# Patient Record
Sex: FEMALE | Race: WHITE | Employment: UNEMPLOYED | URBAN - METROPOLITAN AREA
[De-identification: names, ages, dates, MRNs, and addresses within clinical notes are randomized per-mention and may not be internally consistent; named-entity substitution may affect disease eponyms.]

---

## 2021-01-25 ENCOUNTER — TRANSCRIBE ORDER (OUTPATIENT)
Dept: SCHEDULING | Age: 51
End: 2021-01-25

## 2021-01-25 DIAGNOSIS — Z98.1 HISTORY OF FUSION OF CERVICAL SPINE: ICD-10-CM

## 2021-01-25 DIAGNOSIS — M54.12 CERVICAL RADICULOPATHY: Primary | ICD-10-CM

## 2021-02-02 ENCOUNTER — HOSPITAL ENCOUNTER (OUTPATIENT)
Dept: CT IMAGING | Age: 51
Discharge: HOME | End: 2021-02-02
Attending: ORTHOPAEDIC SURGERY
Payer: COMMERCIAL

## 2021-02-02 DIAGNOSIS — Z98.1 HISTORY OF FUSION OF CERVICAL SPINE: ICD-10-CM

## 2021-02-02 DIAGNOSIS — M54.12 CERVICAL RADICULOPATHY: ICD-10-CM

## 2021-02-02 PROCEDURE — 72125 CT NECK SPINE W/O DYE: CPT

## 2021-04-15 ENCOUNTER — HOSPITAL ENCOUNTER (OUTPATIENT)
Dept: PREADMISSION TESTING | Age: 51
Discharge: HOME | End: 2021-04-15
Attending: ORTHOPAEDIC SURGERY
Payer: COMMERCIAL

## 2021-04-15 VITALS
TEMPERATURE: 97.3 F | HEIGHT: 65 IN | HEART RATE: 75 BPM | RESPIRATION RATE: 19 BRPM | WEIGHT: 162.92 LBS | SYSTOLIC BLOOD PRESSURE: 152 MMHG | BODY MASS INDEX: 27.14 KG/M2 | OXYGEN SATURATION: 97 % | DIASTOLIC BLOOD PRESSURE: 70 MMHG

## 2021-04-15 LAB
25(OH)D3 SERPL-MCNC: 34.9 NG/ML (ref 30–100)
ABO + RH BLD: NORMAL
ALBUMIN SERPL-MCNC: 4.5 G/DL (ref 3.5–5)
ALBUMIN/GLOB SERPL: 1.5 {RATIO} (ref 1.1–2.2)
ALP SERPL-CCNC: 64 U/L (ref 45–117)
ALT SERPL-CCNC: 37 U/L (ref 12–78)
ANION GAP SERPL CALC-SCNC: 6 MMOL/L (ref 5–15)
APPEARANCE UR: CLEAR
APTT PPP: 27.4 SEC (ref 22.1–31)
AST SERPL-CCNC: 24 U/L (ref 15–37)
ATRIAL RATE: 79 BPM
BACTERIA URNS QL MICRO: NEGATIVE /HPF
BASOPHILS # BLD: 0.1 K/UL (ref 0–0.1)
BASOPHILS NFR BLD: 1 % (ref 0–1)
BILIRUB SERPL-MCNC: 0.5 MG/DL (ref 0.2–1)
BILIRUB UR QL: NEGATIVE
BLOOD GROUP ANTIBODIES SERPL: NORMAL
BUN SERPL-MCNC: 12 MG/DL (ref 6–20)
BUN/CREAT SERPL: 24 (ref 12–20)
CALCIUM SERPL-MCNC: 9.3 MG/DL (ref 8.5–10.1)
CALCULATED P AXIS, ECG09: 58 DEGREES
CALCULATED R AXIS, ECG10: 28 DEGREES
CALCULATED T AXIS, ECG11: 35 DEGREES
CHLORIDE SERPL-SCNC: 105 MMOL/L (ref 97–108)
CO2 SERPL-SCNC: 27 MMOL/L (ref 21–32)
COLOR UR: NORMAL
CREAT SERPL-MCNC: 0.51 MG/DL (ref 0.55–1.02)
DIAGNOSIS, 93000: NORMAL
DIFFERENTIAL METHOD BLD: ABNORMAL
EOSINOPHIL # BLD: 0.5 K/UL (ref 0–0.4)
EOSINOPHIL NFR BLD: 6 % (ref 0–7)
EPITH CASTS URNS QL MICRO: NORMAL /LPF
ERYTHROCYTE [DISTWIDTH] IN BLOOD BY AUTOMATED COUNT: 13.1 % (ref 11.5–14.5)
EST. AVERAGE GLUCOSE BLD GHB EST-MCNC: 111 MG/DL
GLOBULIN SER CALC-MCNC: 3 G/DL (ref 2–4)
GLUCOSE SERPL-MCNC: 83 MG/DL (ref 65–100)
GLUCOSE UR STRIP.AUTO-MCNC: NEGATIVE MG/DL
HBA1C MFR BLD: 5.5 % (ref 4–5.6)
HCT VFR BLD AUTO: 40.6 % (ref 35–47)
HGB BLD-MCNC: 13.7 G/DL (ref 11.5–16)
HGB UR QL STRIP: NEGATIVE
HYALINE CASTS URNS QL MICRO: NORMAL /LPF (ref 0–5)
IMM GRANULOCYTES # BLD AUTO: 0 K/UL (ref 0–0.04)
IMM GRANULOCYTES NFR BLD AUTO: 0 % (ref 0–0.5)
INR PPP: 1.1 (ref 0.9–1.1)
KETONES UR QL STRIP.AUTO: NEGATIVE MG/DL
LEUKOCYTE ESTERASE UR QL STRIP.AUTO: NEGATIVE
LYMPHOCYTES # BLD: 2.1 K/UL (ref 0.8–3.5)
LYMPHOCYTES NFR BLD: 29 % (ref 12–49)
MCH RBC QN AUTO: 30.4 PG (ref 26–34)
MCHC RBC AUTO-ENTMCNC: 33.7 G/DL (ref 30–36.5)
MCV RBC AUTO: 90.2 FL (ref 80–99)
MONOCYTES # BLD: 0.8 K/UL (ref 0–1)
MONOCYTES NFR BLD: 11 % (ref 5–13)
NEUTS SEG # BLD: 3.8 K/UL (ref 1.8–8)
NEUTS SEG NFR BLD: 53 % (ref 32–75)
NITRITE UR QL STRIP.AUTO: NEGATIVE
NRBC # BLD: 0 K/UL (ref 0–0.01)
NRBC BLD-RTO: 0 PER 100 WBC
P-R INTERVAL, ECG05: 168 MS
PH UR STRIP: 7 [PH] (ref 5–8)
PLATELET # BLD AUTO: 231 K/UL (ref 150–400)
PMV BLD AUTO: 11 FL (ref 8.9–12.9)
POTASSIUM SERPL-SCNC: 3.9 MMOL/L (ref 3.5–5.1)
PROT SERPL-MCNC: 7.5 G/DL (ref 6.4–8.2)
PROT UR STRIP-MCNC: NEGATIVE MG/DL
PROTHROMBIN TIME: 11 SEC (ref 9–11.1)
Q-T INTERVAL, ECG07: 390 MS
QRS DURATION, ECG06: 84 MS
QTC CALCULATION (BEZET), ECG08: 447 MS
RBC # BLD AUTO: 4.5 M/UL (ref 3.8–5.2)
RBC #/AREA URNS HPF: NORMAL /HPF (ref 0–5)
SODIUM SERPL-SCNC: 138 MMOL/L (ref 136–145)
SP GR UR REFRACTOMETRY: 1.01 (ref 1–1.03)
SPECIMEN EXP DATE BLD: NORMAL
THERAPEUTIC RANGE,PTTT: NORMAL SECS (ref 58–77)
UA: UC IF INDICATED,UAUC: NORMAL
UROBILINOGEN UR QL STRIP.AUTO: 0.2 EU/DL (ref 0.2–1)
VENTRICULAR RATE, ECG03: 79 BPM
WBC # BLD AUTO: 7.2 K/UL (ref 3.6–11)
WBC URNS QL MICRO: NORMAL /HPF (ref 0–4)

## 2021-04-15 PROCEDURE — 85610 PROTHROMBIN TIME: CPT

## 2021-04-15 PROCEDURE — 86901 BLOOD TYPING SEROLOGIC RH(D): CPT

## 2021-04-15 PROCEDURE — 93005 ELECTROCARDIOGRAM TRACING: CPT

## 2021-04-15 PROCEDURE — 83036 HEMOGLOBIN GLYCOSYLATED A1C: CPT

## 2021-04-15 PROCEDURE — 85730 THROMBOPLASTIN TIME PARTIAL: CPT

## 2021-04-15 PROCEDURE — 82306 VITAMIN D 25 HYDROXY: CPT

## 2021-04-15 PROCEDURE — 80053 COMPREHEN METABOLIC PANEL: CPT

## 2021-04-15 PROCEDURE — 85025 COMPLETE CBC W/AUTO DIFF WBC: CPT

## 2021-04-15 PROCEDURE — 81001 URINALYSIS AUTO W/SCOPE: CPT

## 2021-04-15 PROCEDURE — 36415 COLL VENOUS BLD VENIPUNCTURE: CPT

## 2021-04-15 RX ORDER — BISMUTH SUBSALICYLATE 262 MG
1 TABLET,CHEWABLE ORAL DAILY
COMMUNITY
End: 2022-08-03

## 2021-04-15 RX ORDER — ACETAMINOPHEN 500 MG
1000 TABLET ORAL AS NEEDED
COMMUNITY

## 2021-04-15 RX ORDER — SIMVASTATIN 40 MG/1
40 TABLET, FILM COATED ORAL
COMMUNITY

## 2021-04-15 RX ORDER — IBUPROFEN 200 MG
400 TABLET ORAL AS NEEDED
COMMUNITY
End: 2021-04-30

## 2021-04-15 NOTE — PERIOP NOTES
[address] , [address] [facility]   MAIN OR                                  [phone]   MAIN PRE OP                          [phone]                                                                                AMBULATORY PRE OP          [phone]  PRE-ADMISSION TESTING    [phone]   Surgery Date: Tuesday 4/27/21      Is surgery arrival time given by surgeon? NO  If [facility] staff will call you Monday 4/26/21 between 3 and 7pm the day before your surgery with your arrival time. (If your surgery is on a Monday, we will call you the Friday before.)    Call [phone] after 7pm Monday-Friday if you did not receive this call. INSTRUCTIONS BEFORE YOUR SURGERY   When You  Arrive Arrive at the 2nd [address] [facility] on the day of your surgery  Have your insurance card, photo ID, and any copayment (if needed)   Food   and   Drink NO food or drink after midnight the night before surgery    This means NO water, gum, mints, coffee, juice, etc.  No alcohol (beer, wine, liquor) 24 hours before and after surgery   Medications to   TAKE   Morning of Surgery MEDICATIONS TO TAKE THE MORNING OF SURGERY WITH A SIP OF WATER:    none   Medications  To  STOP      7 days before surgery  Non-Steroidal anti-inflammatory Drugs (NSAID's): for example, Ibuprofen (Advil, Motrin), Naproxen (Aleve)   Aspirin, if taking for pain    Herbal supplements, vitamins, and fish oil     Blood  Thinners  If you take  Aspirin, Plavix, Coumadin, or any blood-thinning or anti-blood clot medicine, talk to the doctor who prescribed the medications for pre-operative instructions.    Bathing Clothing  Jewelry  Valuables      MAY shower the morning of surgery, please do not apply anything to your skin (lotions, powders, deodorant, or makeup, especially mascara)   Follow Chlorhexidine Care Fusion body wash instructions provided to you during PAT appointment. Begin 3 days prior to surgery.  Do not shave or trim anywhere 24 hours before surgery   Wear your hair loose or down; no pony-tails, buns, or metal hair clips   Wear loose, comfortable, clean clothes   Wear glasses instead of contacts   Leave money, valuables, and jewelry, including body piercings, at home   Going Home - or Spending the Night  SAME-DAY SURGERY: You must have a responsible adult drive you home and stay with you 24 hours after surgery   ADMITS: If your doctor is keeping you in the hospital after surgery, leave personal belongings/luggage in your car until you have a hospital room number. Hospital discharge time is 12 noon  Drivers must be here before 12 noon unless you are told differently   Special Instructions · Bring PTA Medication list day of surgery with the last doses taken documented   · Do not bring medication bottles the day of surgery      It is now mandated that all surgical patients be tested for COVID-19 prior to surgery. Testing has to be exactly 4 days prior to surgery. Your COVID test date is Friday 4/23/21 between 8:00 am and 11:00 am.       COVID testing will be performed curbside at the [facility] [address]. There will be signs leading you to the testing site. You will need to bring a photo ID with you to be swabbed. Patients are advised to self-quarantine at home after testing and prior to your surgery date. You will be notified if your results are positive.     What to watch for:   Coronavirus (COVID-19) affects different people in different ways   It also appears with a wide range of symptoms from mild to severe   Signs usually appear 2-14 days after exposure     If you develop any of the following, notify your doctor immediately:  o Fever  o Chills, with or without a shiver  o Muscle pain  o Headache  o Sore throat  o Dry cough  o New loss of taste or smell  o Tiredness      If you develop any of the following, call 911:  o Shortness of breath  o Difficulty breathing  o Chest pain  o New confusion  o Blueness of fingers and/or lips     Follow all instructions so your surgery wont be cancelled. Please, be on time. If a situation occurs and you are delayed the day of surgery, call [phone] or [phone]. If your physical condition changes (like a fever, cold, flu, etc.) call your surgeon. Home medication(s) reviewed and verified via     LIST   VERBAL   during PAT appointment. The patient was contacted by     IN-PERSON  The patient verbalizes understanding of all instructions and     DOES NOT   need reinforcement.

## 2021-04-15 NOTE — H&P
Preoperative Evaluation                     History and Physical with Surgical Risk Stratification     4/15/2021    CC: Neck pain    HPI:   Eleazar Lea is a 48 y.o. female referred for pre-operative evaluation by Dr. Elizabeth Ren for surgery on 4/27/21 & 4/28/21. Faheem Shah has had three previous neck fusions, first one in 2017 was a C4-7 at [facility]. She then had two more, 3/2020 & 7/2020 C3-4 and a posterior C6-7 at [address] [place] [address]. After her march surgery she felt good until her pain returned and she underwent the second sx in July. She notes after that surgery her pain never left and has gotten worse. She has muscle spasms in her upper back, down both arms into her hands. Her left arm feels worse. She has intermittent pins and needles. She has weakness in both arms and drops things. She has failed all conservative treatments and wishes to proceed with surgery. The patient was evaluated in the surgeon's office and it was determined that the most appropriate plan of care is to proceed with surgical intervention. Patient's PCP Alex Sprague MD    Review of Systems     Constitutional: Negative for chills and fever  HENT: Negative for congestion and sore throat  Eyes: negative for blurred vision and double vision  Respiratory: Negative for cough, shortness of breath and wheezing  Mouth: Negative for loose, broken or chipped teeth. Cardiovascular: Negative for chest pain and palpitations  Gastrointestinal: Negative for abdominal pain, nausea, diarrhea & constipation  Genitourinary: Negative for dysuria and hematuria  Musculoskeletal: Neck pain  Skin: Negative for rash, open wounds. Neurological: Negative for dizziness, tremors and headaches  Psychiatric: The patient is not nervous/anxious.     Inherent Risk of Surgery     Surgical risk:  Intermediate  Intermediate:  Joint Replacement, Spinal surgery, abdominal, thoracic, carotid endarterctomy, prostate, head and neck    Patient Cardiac Risk Assessment Revised Cardiac Risk Index (RCRI)    Rate if cardiac death, nonfatal MI, nonfatal cardiac arrest by number of risk factor- 0.4%    CASSANDRA/AHA 2007 Guidelines:   1) Surgery Emergency, Non-cardiac -> to surgery  2) If not, look at clinical predictors    Intermediate Minor     Blood Thinner: NA    METS     >4 METS Climb a flight of stairs or a hill Walk on level ground at 4 mph Run a short distance Heavy work around house (scrub floors, move furniture)     Other Risk Factors:   Screening for ETOH use:  Done and low risk  Smoking status:  Former   Marijuana Use:  No    Personal or FH of bleeding problems:  No  Personal or FH of blood clots:  No  Personal or FH of anesthesia problems:   PONV    Pulmonary Risk:  Asthma or COPD:  No  Body mass index is 27.11 kg/m². Known MATT:  No    Past Medical, Surgical, Social History     Allergies: Allergies   Allergen Reactions    Erythromycin Other (comments)     \"stomach pain\" vomiting    Penicillins Rash and Swelling       Medication Documentation Review Audit     Reviewed by Gianluca Chen RN (Registered Nurse) on 04/15/21 at 1108    Medication Sig Documenting Provider Last Dose Status Taking?   acetaminophen (Tylenol Extra Strength) 500 mg tablet Take 1,000 mg by mouth as needed for Pain. Provider, Historical  Active Yes   ibuprofen (AdviL) 200 mg tablet Take 400 mg by mouth as needed for Pain. Provider, Historical  Active Yes   multivitamin (ONE A DAY) tablet Take 1 Tab by mouth daily. Provider, Historical  Active Yes   simvastatin (ZOCOR) 40 mg tablet Take 40 mg by mouth nightly.  Provider, Historical  Active Yes                Past Medical History:   Diagnosis Date    Chronic neck pain     COVID-19 vaccine series completed 04/2021    Moderna    High cholesterol     PONV (postoperative nausea and vomiting)      Past Surgical History:   Procedure Laterality Date    HX APPENDECTOMY      HX BUNIONECTOMY Left     HX CARPAL TUNNEL RELEASE Bilateral     HX CERVICAL FUSION  2017    C 4-7 @ [place] with Dr. Neno Ross 59  2020    C 3-4    HX CERVICAL FUSION  2020    C6-7 posterior    HX HYSTERECTOMY      HX LAP CHOLECYSTECTOMY      HX PARTIAL THYROIDECTOMY      HX TONSILLECTOMY      IR INJ TENDON SHEATH/LIGA SNGL Bilateral     Elbow     Social History     Tobacco Use    Smoking status: Former Smoker     Types: Cigarettes     Quit date: 2000     Years since quittin.3    Smokeless tobacco: Never Used   Substance Use Topics    Alcohol use: Yes     Alcohol/week: 2.0 standard drinks     Types: 2 Glasses of wine per week    Drug use: Never     Family History   Problem Relation Age of Onset    Anesth Problems Neg Hx     Clotting Disorder Neg Hx        Objective     Vitals:    04/15/21 1105   BP: (!) 152/70   Pulse: 75   Resp: 19   Temp: 97.3 °F (36.3 °C)   SpO2: 97%   Weight: 73.9 kg (162 lb 14.7 oz)   Height: 5' 5\" (1.651 m)       Constitutional:  Appears well,  No Acute Distress, Vitals noted  Psychiatric:   Affect normal, Alert and Oriented to person/place/time    Eyes:   Pupils equally round and reactive, EOMI, conjunctiva clear, eyelids normal  ENT:   External ears and nose normal, teeth normal, gums normal, TMs and Orophyarynx normal  Neck:   General inspection and Thyroid normal.  No abnormal cervical or supraclavicular nodes    Lungs:   Clear to auscultation, good respiratory effort  Heart: Ausculation normal.  Regular rhythm. No cardiac murmurs.   No carotid bruits or palpable thrills  Chest wall normal  Musculoskeletal:  weaker with left hand, limited ROM to cervical spine  Extremities:   Without edema, good peripheral pulses  Skin:   Warm to palpation, without rashes, bruising, or suspicious lesions     Recent Results (from the past 168 hour(s))   HEMOGLOBIN A1C WITH EAG    Collection Time: 04/15/21 12:11 PM   Result Value Ref Range    Hemoglobin A1c 5.5 4.0 - 5.6 %    Est. average glucose 111 mg/dL   PROTHROMBIN TIME + INR Collection Time: 04/15/21 12:11 PM   Result Value Ref Range    INR 1.1 0.9 - 1.1      Prothrombin time 11.0 9.0 - 11.1 sec   PTT    Collection Time: 04/15/21 12:11 PM   Result Value Ref Range    aPTT 27.4 22.1 - 31.0 sec    aPTT, therapeutic range     58.0 - 77.0 SECS   URINALYSIS W/ REFLEX CULTURE    Collection Time: 04/15/21 12:11 PM    Specimen: Urine   Result Value Ref Range    Color YELLOW/STRAW      Appearance CLEAR CLEAR      Specific gravity 1.015 1.003 - 1.030      pH (UA) 7.0 5.0 - 8.0      Protein Negative NEG mg/dL    Glucose Negative NEG mg/dL    Ketone Negative NEG mg/dL    Bilirubin Negative NEG      Blood Negative NEG      Urobilinogen 0.2 0.2 - 1.0 EU/dL    Nitrites Negative NEG      Leukocyte Esterase Negative NEG      WBC 0-4 0 - 4 /hpf    RBC 0-5 0 - 5 /hpf    Epithelial cells FEW FEW /lpf    Bacteria Negative NEG /hpf    UA:UC IF INDICATED CULTURE NOT INDICATED BY UA RESULT CNI      Hyaline cast 0-2 0 - 5 /lpf   VITAMIN D, 25 HYDROXY    Collection Time: 04/15/21 12:11 PM   Result Value Ref Range    Vitamin D 25-Hydroxy 34.9 30 - 100 ng/mL   TYPE & SCREEN    Collection Time: 04/15/21 12:11 PM   Result Value Ref Range    Crossmatch Expiration 04/29/2021,2359     ABO/Rh(D) A POSITIVE     Antibody screen NEG    CBC WITH AUTOMATED DIFF    Collection Time: 04/15/21 12:35 PM   Result Value Ref Range    WBC 7.2 3.6 - 11.0 K/uL    RBC 4.50 3.80 - 5.20 M/uL    HGB 13.7 11.5 - 16.0 g/dL    HCT 40.6 35.0 - 47.0 %    MCV 90.2 80.0 - 99.0 FL    MCH 30.4 26.0 - 34.0 PG    MCHC 33.7 30.0 - 36.5 g/dL    RDW 13.1 11.5 - 14.5 %    PLATELET 900 623 - 446 K/uL    MPV 11.0 8.9 - 12.9 FL    NRBC 0.0 0  WBC    ABSOLUTE NRBC 0.00 0.00 - 0.01 K/uL    NEUTROPHILS 53 32 - 75 %    LYMPHOCYTES 29 12 - 49 %    MONOCYTES 11 5 - 13 %    EOSINOPHILS 6 0 - 7 %    BASOPHILS 1 0 - 1 %    IMMATURE GRANULOCYTES 0 0.0 - 0.5 %    ABS. NEUTROPHILS 3.8 1.8 - 8.0 K/UL    ABS. LYMPHOCYTES 2.1 0.8 - 3.5 K/UL    ABS.  MONOCYTES 0.8 0.0 - 1.0 K/UL    ABS. EOSINOPHILS 0.5 (H) 0.0 - 0.4 K/UL    ABS. BASOPHILS 0.1 0.0 - 0.1 K/UL    ABS. IMM. GRANS. 0.0 0.00 - 0.04 K/UL    DF AUTOMATED     METABOLIC PANEL, COMPREHENSIVE    Collection Time: 04/15/21 12:35 PM   Result Value Ref Range    Sodium 138 136 - 145 mmol/L    Potassium 3.9 3.5 - 5.1 mmol/L    Chloride 105 97 - 108 mmol/L    CO2 27 21 - 32 mmol/L    Anion gap 6 5 - 15 mmol/L    Glucose 83 65 - 100 mg/dL    BUN 12 6 - 20 MG/DL    Creatinine 0.51 (L) 0.55 - 1.02 MG/DL    BUN/Creatinine ratio 24 (H) 12 - 20      GFR est AA >60 >60 ml/min/1.73m2    GFR est non-AA >60 >60 ml/min/1.73m2    Calcium 9.3 8.5 - 10.1 MG/DL    Bilirubin, total 0.5 0.2 - 1.0 MG/DL    ALT (SGPT) 37 12 - 78 U/L    AST (SGOT) 24 15 - 37 U/L    Alk. phosphatase 64 45 - 117 U/L    Protein, total 7.5 6.4 - 8.2 g/dL    Albumin 4.5 3.5 - 5.0 g/dL    Globulin 3.0 2.0 - 4.0 g/dL    A-G Ratio 1.5 1.1 - 2.2     EKG, 12 LEAD, INITIAL    Collection Time: 04/15/21 12:42 PM   Result Value Ref Range    Ventricular Rate 79 BPM    Atrial Rate 79 BPM    P-R Interval 168 ms    QRS Duration 84 ms    Q-T Interval 390 ms    QTC Calculation (Bezet) 447 ms    Calculated P Axis 58 degrees    Calculated R Axis 28 degrees    Calculated T Axis 35 degrees    Diagnosis       Normal sinus rhythm  Normal ECG  No previous ECGs available  Confirmed by Jade Kanner MD., Elana Dust ([id number]) on 4/15/2021 6:32:35 PM         Assessment and Plan     Assessment/Plan:   1) Cervical Stenosis  2) Pre-Operative Evaluation    Labs and EKG reviewed.  MRSA pending    Plan:  4/27/21: C4-7 ANTERIOR HARDWARE REMOVAL, C7 - T1 ANTERIOR CERVICAL DISCECTOMY AND  FUSION WITH INSTRUMENTATION, OSTEO AMP AND ILIAC CREST BONE MARROW ASPIRATE    4/28/21: C6-7 POSTERIOR HARDWARE REMOVAL/ C3-T2 POSTERIOR CERVICAL FUSION WITH INSTRUMENTATION/OSTEO AMP AND ILIAC CREST BONE MARROW ASPIRATE WITH IMAGE GUIDANCE    Preoperative Clearance  Per RCRI, the patient has a 0.4% risk of cardiac death, nonfatal MI, nonfatal cardiac arrest based on no risk factors. Per ACC/AHA guidelines, patient is low risk for a(n) intermediate risk surgery and may proceed to planned surgery with the above noted risk.     Tammy Hamilton, NP

## 2021-04-16 LAB
BACTERIA SPEC CULT: NORMAL
BACTERIA SPEC CULT: NORMAL
SERVICE CMNT-IMP: NORMAL

## 2021-04-23 ENCOUNTER — HOSPITAL ENCOUNTER (OUTPATIENT)
Dept: PREADMISSION TESTING | Age: 51
Discharge: HOME | End: 2021-04-23
Payer: COMMERCIAL

## 2021-04-23 PROCEDURE — U0005 INFEC AGEN DETEC AMPLI PROBE: HCPCS

## 2021-04-24 LAB — SARS-COV-2, COV2NT: NOT DETECTED

## 2021-04-26 ENCOUNTER — ANESTHESIA EVENT (OUTPATIENT)
Dept: SURGERY | Age: 51
DRG: 454 | End: 2021-04-26
Payer: COMMERCIAL

## 2021-04-26 NOTE — PROGRESS NOTES
The patient was provided a virtul link to view the pre-operative Spine Class. A pre-operative Patient education booklet specific to spine surgery was given to the patient in PAT. The content of the class was presented using an audio power point presentation specific for patients undergoing spine surgery. Incentive spirometer and CHG bath kits were verbally reviewed. Day of surgery routine and expectations, hospital routine and expectations, nutrition, alcohol, nicotine, medications, infection control, pain management, DVT precautions and equipment, ice therapy, durable medical equipment, exercises, mobility expectations and precautions, home preparation and safety were reviewed in class. My contact information was shared with the patient to provide further information as requested by the patient related to their upcoming surgery. Patient sent email confirmation that they viewed spine class online.

## 2021-04-27 ENCOUNTER — HOSPITAL ENCOUNTER (INPATIENT)
Age: 51
LOS: 3 days | Discharge: HOME | DRG: 454 | End: 2021-04-30
Attending: ORTHOPAEDIC SURGERY | Admitting: ORTHOPAEDIC SURGERY
Payer: COMMERCIAL

## 2021-04-27 ENCOUNTER — ANESTHESIA EVENT (OUTPATIENT)
Dept: SURGERY | Age: 51
DRG: 454 | End: 2021-04-27
Payer: COMMERCIAL

## 2021-04-27 ENCOUNTER — ANESTHESIA (OUTPATIENT)
Dept: SURGERY | Age: 51
DRG: 454 | End: 2021-04-27
Payer: COMMERCIAL

## 2021-04-27 ENCOUNTER — APPOINTMENT (OUTPATIENT)
Dept: GENERAL RADIOLOGY | Age: 51
DRG: 454 | End: 2021-04-27
Attending: ORTHOPAEDIC SURGERY
Payer: COMMERCIAL

## 2021-04-27 DIAGNOSIS — M43.12 ACQUIRED SPONDYLOLISTHESIS OF CERVICAL VERTEBRA: Primary | ICD-10-CM

## 2021-04-27 PROCEDURE — 74011250636 HC RX REV CODE- 250/636: Performed by: NURSE PRACTITIONER

## 2021-04-27 PROCEDURE — 77030031139 HC SUT VCRL2 J&J -A: Performed by: ORTHOPAEDIC SURGERY

## 2021-04-27 PROCEDURE — 77030033138 HC SUT PGA STRATFX J&J -B: Performed by: ORTHOPAEDIC SURGERY

## 2021-04-27 PROCEDURE — 77030013079 HC BLNKT BAIR HGGR 3M -A: Performed by: ANESTHESIOLOGY

## 2021-04-27 PROCEDURE — 77030041279 HC DRSG PRMSL AG MDII -B: Performed by: ORTHOPAEDIC SURGERY

## 2021-04-27 PROCEDURE — 77030038222 HC BUR MIDSREX MEDT -D: Performed by: ORTHOPAEDIC SURGERY

## 2021-04-27 PROCEDURE — 77030028271 HC SRGFL HEMSTAT MTRX KT J&J -C: Performed by: ORTHOPAEDIC SURGERY

## 2021-04-27 PROCEDURE — 74011250636 HC RX REV CODE- 250/636: Performed by: ANESTHESIOLOGY

## 2021-04-27 PROCEDURE — 74011250637 HC RX REV CODE- 250/637: Performed by: NURSE PRACTITIONER

## 2021-04-27 PROCEDURE — 74011250637 HC RX REV CODE- 250/637: Performed by: ANESTHESIOLOGY

## 2021-04-27 PROCEDURE — 65270000029 HC RM PRIVATE

## 2021-04-27 PROCEDURE — 74011000250 HC RX REV CODE- 250: Performed by: NURSE PRACTITIONER

## 2021-04-27 PROCEDURE — 07DR3ZZ EXTRACTION OF ILIAC BONE MARROW, PERCUTANEOUS APPROACH: ICD-10-PCS | Performed by: ORTHOPAEDIC SURGERY

## 2021-04-27 PROCEDURE — 0RT50ZZ RESECTION OF CERVICOTHORACIC VERTEBRAL DISC, OPEN APPROACH: ICD-10-PCS | Performed by: ORTHOPAEDIC SURGERY

## 2021-04-27 PROCEDURE — C1713 ANCHOR/SCREW BN/BN,TIS/BN: HCPCS | Performed by: ORTHOPAEDIC SURGERY

## 2021-04-27 PROCEDURE — 74011250636 HC RX REV CODE- 250/636: Performed by: ORTHOPAEDIC SURGERY

## 2021-04-27 PROCEDURE — C1889 IMPLANT/INSERT DEVICE, NOC: HCPCS | Performed by: ORTHOPAEDIC SURGERY

## 2021-04-27 PROCEDURE — 76060000037 HC ANESTHESIA 3 TO 3.5 HR: Performed by: ORTHOPAEDIC SURGERY

## 2021-04-27 PROCEDURE — 74011250636 HC RX REV CODE- 250/636: Performed by: NURSE ANESTHETIST, CERTIFIED REGISTERED

## 2021-04-27 PROCEDURE — 77030039147 HC PWDR HEMSTS SURGICEL JNJ -D: Performed by: ORTHOPAEDIC SURGERY

## 2021-04-27 PROCEDURE — 77030037058 HC GRFT BN PTTY OSTEOAMP BTUS -F: Performed by: ORTHOPAEDIC SURGERY

## 2021-04-27 PROCEDURE — 77030011267 HC ELECTRD BLD COVD -A: Performed by: ORTHOPAEDIC SURGERY

## 2021-04-27 PROCEDURE — 0PP304Z REMOVAL OF INTERNAL FIXATION DEVICE FROM CERVICAL VERTEBRA, OPEN APPROACH: ICD-10-PCS | Performed by: ORTHOPAEDIC SURGERY

## 2021-04-27 PROCEDURE — 94760 N-INVAS EAR/PLS OXIMETRY 1: CPT

## 2021-04-27 PROCEDURE — 77030040922 HC BLNKT HYPOTHRM STRY -A

## 2021-04-27 PROCEDURE — 77030003666 HC NDL SPINAL BD -A: Performed by: ORTHOPAEDIC SURGERY

## 2021-04-27 PROCEDURE — 77030040356 HC CORD BPLR FRCP COVD -A: Performed by: ORTHOPAEDIC SURGERY

## 2021-04-27 PROCEDURE — 0RB53ZZ EXCISION OF CERVICOTHORACIC VERTEBRAL DISC, PERCUTANEOUS APPROACH: ICD-10-PCS | Performed by: ORTHOPAEDIC SURGERY

## 2021-04-27 PROCEDURE — 77030027138 HC INCENT SPIROMETER -A

## 2021-04-27 PROCEDURE — 77030040361 HC SLV COMPR DVT MDII -B

## 2021-04-27 PROCEDURE — 74011000250 HC RX REV CODE- 250: Performed by: NURSE ANESTHETIST, CERTIFIED REGISTERED

## 2021-04-27 PROCEDURE — 77030034475 HC MISC IMPL SPN: Performed by: ORTHOPAEDIC SURGERY

## 2021-04-27 PROCEDURE — 77030008684 HC TU ET CUF COVD -B: Performed by: ANESTHESIOLOGY

## 2021-04-27 PROCEDURE — 74011250636 HC RX REV CODE- 250/636

## 2021-04-27 PROCEDURE — 77030008771 HC TU NG SALEM SUMP -A: Performed by: ANESTHESIOLOGY

## 2021-04-27 PROCEDURE — 2709999900 HC NON-CHARGEABLE SUPPLY: Performed by: ORTHOPAEDIC SURGERY

## 2021-04-27 PROCEDURE — 77030026438 HC STYL ET INTUB CARD -A: Performed by: ANESTHESIOLOGY

## 2021-04-27 PROCEDURE — 76010000173 HC OR TIME 3 TO 3.5 HR INTENSV-TIER 1: Performed by: ORTHOPAEDIC SURGERY

## 2021-04-27 PROCEDURE — 77030030102 HC BIT DRL PYRNES K2M -B: Performed by: ORTHOPAEDIC SURGERY

## 2021-04-27 PROCEDURE — 77030010507 HC ADH SKN DERMBND J&J -B: Performed by: ORTHOPAEDIC SURGERY

## 2021-04-27 PROCEDURE — 77030005513 HC CATH URETH FOL11 MDII -B: Performed by: ORTHOPAEDIC SURGERY

## 2021-04-27 PROCEDURE — 74011000250 HC RX REV CODE- 250: Performed by: ORTHOPAEDIC SURGERY

## 2021-04-27 PROCEDURE — 77030040922 HC BLNKT HYPOTHRM STRY -A: Performed by: ORTHOPAEDIC SURGERY

## 2021-04-27 PROCEDURE — 77030035129: Performed by: ORTHOPAEDIC SURGERY

## 2021-04-27 PROCEDURE — 77030018673: Performed by: ORTHOPAEDIC SURGERY

## 2021-04-27 PROCEDURE — 0RG40A0 FUSION OF CERVICOTHORACIC VERTEBRAL JOINT WITH INTERBODY FUSION DEVICE, ANTERIOR APPROACH, ANTERIOR COLUMN, OPEN APPROACH: ICD-10-PCS | Performed by: ORTHOPAEDIC SURGERY

## 2021-04-27 PROCEDURE — 76210000002 HC OR PH I REC 3 TO 3.5 HR: Performed by: ORTHOPAEDIC SURGERY

## 2021-04-27 PROCEDURE — 77030038552 HC DRN WND MDII -A: Performed by: ORTHOPAEDIC SURGERY

## 2021-04-27 DEVICE — GRAFT BONE SUB 2CC MTRX VIABLE NEXT GENERATION VIBONE: Type: IMPLANTABLE DEVICE | Site: SPINE CERVICAL | Status: FUNCTIONAL

## 2021-04-27 DEVICE — CAGE SPNL W14XH7MM D12MM 6DEG ANT CERV INTBDY FUS LORD W/: Type: IMPLANTABLE DEVICE | Site: SPINE CERVICAL | Status: FUNCTIONAL

## 2021-04-27 DEVICE — IMPLANTABLE DEVICE: Type: IMPLANTABLE DEVICE | Site: SPINE CERVICAL | Status: FUNCTIONAL

## 2021-04-27 DEVICE — PLATE SPNL 1 LEVEL 20 MM ANTR CERV CONSTRN PYRENESS: Type: IMPLANTABLE DEVICE | Site: SPINE CERVICAL | Status: FUNCTIONAL

## 2021-04-27 DEVICE — GRAFT BNE FIBER 1 CC OSTEOAMP SEL: Type: IMPLANTABLE DEVICE | Site: SPINE CERVICAL | Status: FUNCTIONAL

## 2021-04-27 RX ORDER — ACETAMINOPHEN 500 MG
1000 TABLET ORAL EVERY 6 HOURS
Status: DISCONTINUED | OUTPATIENT
Start: 2021-04-27 | End: 2021-04-28

## 2021-04-27 RX ORDER — NALOXONE HYDROCHLORIDE 0.4 MG/ML
0.2 INJECTION, SOLUTION INTRAMUSCULAR; INTRAVENOUS; SUBCUTANEOUS
Status: DISCONTINUED | OUTPATIENT
Start: 2021-04-27 | End: 2021-04-27 | Stop reason: HOSPADM

## 2021-04-27 RX ORDER — FAMOTIDINE 20 MG/1
20 TABLET, FILM COATED ORAL 2 TIMES DAILY
Status: DISCONTINUED | OUTPATIENT
Start: 2021-04-27 | End: 2021-04-28

## 2021-04-27 RX ORDER — SCOLOPAMINE TRANSDERMAL SYSTEM 1 MG/1
1 PATCH, EXTENDED RELEASE TRANSDERMAL ONCE
Status: COMPLETED | OUTPATIENT
Start: 2021-04-27 | End: 2021-04-30

## 2021-04-27 RX ORDER — SODIUM CHLORIDE 0.9 % (FLUSH) 0.9 %
5-40 SYRINGE (ML) INJECTION EVERY 8 HOURS
Status: DISCONTINUED | OUTPATIENT
Start: 2021-04-27 | End: 2021-04-30 | Stop reason: HOSPADM

## 2021-04-27 RX ORDER — NALOXONE HYDROCHLORIDE 0.4 MG/ML
0.4 INJECTION, SOLUTION INTRAMUSCULAR; INTRAVENOUS; SUBCUTANEOUS AS NEEDED
Status: DISCONTINUED | OUTPATIENT
Start: 2021-04-27 | End: 2021-04-28

## 2021-04-27 RX ORDER — SODIUM CHLORIDE 0.9 % (FLUSH) 0.9 %
5-40 SYRINGE (ML) INJECTION EVERY 8 HOURS
Status: DISCONTINUED | OUTPATIENT
Start: 2021-04-27 | End: 2021-04-27 | Stop reason: HOSPADM

## 2021-04-27 RX ORDER — ONDANSETRON 2 MG/ML
INJECTION INTRAMUSCULAR; INTRAVENOUS AS NEEDED
Status: DISCONTINUED | OUTPATIENT
Start: 2021-04-27 | End: 2021-04-27 | Stop reason: HOSPADM

## 2021-04-27 RX ORDER — KETOROLAC TROMETHAMINE 30 MG/ML
INJECTION, SOLUTION INTRAMUSCULAR; INTRAVENOUS AS NEEDED
Status: DISCONTINUED | OUTPATIENT
Start: 2021-04-27 | End: 2021-04-27 | Stop reason: HOSPADM

## 2021-04-27 RX ORDER — DIAZEPAM 5 MG/1
5 TABLET ORAL
Status: DISCONTINUED | OUTPATIENT
Start: 2021-04-27 | End: 2021-04-28

## 2021-04-27 RX ORDER — SODIUM CHLORIDE 0.9 % (FLUSH) 0.9 %
5-40 SYRINGE (ML) INJECTION AS NEEDED
Status: DISCONTINUED | OUTPATIENT
Start: 2021-04-27 | End: 2021-04-27 | Stop reason: HOSPADM

## 2021-04-27 RX ORDER — SODIUM CHLORIDE 9 MG/ML
125 INJECTION, SOLUTION INTRAVENOUS CONTINUOUS
Status: DISPENSED | OUTPATIENT
Start: 2021-04-27 | End: 2021-04-28

## 2021-04-27 RX ORDER — THERA TABS 400 MCG
1 TAB ORAL DAILY
Status: DISCONTINUED | OUTPATIENT
Start: 2021-04-28 | End: 2021-04-30 | Stop reason: HOSPADM

## 2021-04-27 RX ORDER — DEXAMETHASONE SODIUM PHOSPHATE 4 MG/ML
4 INJECTION, SOLUTION INTRA-ARTICULAR; INTRALESIONAL; INTRAMUSCULAR; INTRAVENOUS; SOFT TISSUE
Status: DISCONTINUED | OUTPATIENT
Start: 2021-04-27 | End: 2021-04-30 | Stop reason: HOSPADM

## 2021-04-27 RX ORDER — FACIAL-BODY WIPES
10 EACH TOPICAL DAILY PRN
Status: DISCONTINUED | OUTPATIENT
Start: 2021-04-29 | End: 2021-04-28 | Stop reason: SDUPTHER

## 2021-04-27 RX ORDER — FAMOTIDINE 10 MG/ML
INJECTION INTRAVENOUS AS NEEDED
Status: DISCONTINUED | OUTPATIENT
Start: 2021-04-27 | End: 2021-04-27 | Stop reason: HOSPADM

## 2021-04-27 RX ORDER — SODIUM CHLORIDE, SODIUM LACTATE, POTASSIUM CHLORIDE, CALCIUM CHLORIDE 600; 310; 30; 20 MG/100ML; MG/100ML; MG/100ML; MG/100ML
125 INJECTION, SOLUTION INTRAVENOUS CONTINUOUS
Status: DISCONTINUED | OUTPATIENT
Start: 2021-04-27 | End: 2021-04-27 | Stop reason: HOSPADM

## 2021-04-27 RX ORDER — SUCCINYLCHOLINE CHLORIDE 20 MG/ML
INJECTION INTRAMUSCULAR; INTRAVENOUS AS NEEDED
Status: DISCONTINUED | OUTPATIENT
Start: 2021-04-27 | End: 2021-04-27 | Stop reason: HOSPADM

## 2021-04-27 RX ORDER — ONDANSETRON 2 MG/ML
4 INJECTION INTRAMUSCULAR; INTRAVENOUS AS NEEDED
Status: DISCONTINUED | OUTPATIENT
Start: 2021-04-27 | End: 2021-04-27 | Stop reason: HOSPADM

## 2021-04-27 RX ORDER — MIDAZOLAM HYDROCHLORIDE 1 MG/ML
INJECTION, SOLUTION INTRAMUSCULAR; INTRAVENOUS AS NEEDED
Status: DISCONTINUED | OUTPATIENT
Start: 2021-04-27 | End: 2021-04-27 | Stop reason: HOSPADM

## 2021-04-27 RX ORDER — HYDROMORPHONE HYDROCHLORIDE 1 MG/ML
.5-1 INJECTION, SOLUTION INTRAMUSCULAR; INTRAVENOUS; SUBCUTANEOUS
Status: DISCONTINUED | OUTPATIENT
Start: 2021-04-27 | End: 2021-04-27 | Stop reason: HOSPADM

## 2021-04-27 RX ORDER — LIDOCAINE HYDROCHLORIDE 10 MG/ML
0.1 INJECTION, SOLUTION EPIDURAL; INFILTRATION; INTRACAUDAL; PERINEURAL AS NEEDED
Status: DISCONTINUED | OUTPATIENT
Start: 2021-04-27 | End: 2021-04-27 | Stop reason: HOSPADM

## 2021-04-27 RX ORDER — ATORVASTATIN CALCIUM 20 MG/1
40 TABLET, FILM COATED ORAL
Status: DISCONTINUED | OUTPATIENT
Start: 2021-04-27 | End: 2021-04-30 | Stop reason: HOSPADM

## 2021-04-27 RX ORDER — HYDROMORPHONE HYDROCHLORIDE 1 MG/ML
INJECTION, SOLUTION INTRAMUSCULAR; INTRAVENOUS; SUBCUTANEOUS
Status: COMPLETED
Start: 2021-04-27 | End: 2021-04-27

## 2021-04-27 RX ORDER — PHENYLEPHRINE HCL IN 0.9% NACL 0.4MG/10ML
SYRINGE (ML) INTRAVENOUS AS NEEDED
Status: DISCONTINUED | OUTPATIENT
Start: 2021-04-27 | End: 2021-04-27 | Stop reason: HOSPADM

## 2021-04-27 RX ORDER — PROPOFOL 10 MG/ML
INJECTION, EMULSION INTRAVENOUS AS NEEDED
Status: DISCONTINUED | OUTPATIENT
Start: 2021-04-27 | End: 2021-04-27 | Stop reason: HOSPADM

## 2021-04-27 RX ORDER — DEXAMETHASONE SODIUM PHOSPHATE 4 MG/ML
INJECTION, SOLUTION INTRA-ARTICULAR; INTRALESIONAL; INTRAMUSCULAR; INTRAVENOUS; SOFT TISSUE AS NEEDED
Status: DISCONTINUED | OUTPATIENT
Start: 2021-04-27 | End: 2021-04-27 | Stop reason: HOSPADM

## 2021-04-27 RX ORDER — MORPHINE SULFATE 2 MG/ML
2 INJECTION, SOLUTION INTRAMUSCULAR; INTRAVENOUS
Status: ACTIVE | OUTPATIENT
Start: 2021-04-27 | End: 2021-04-28

## 2021-04-27 RX ORDER — SODIUM CHLORIDE 0.9 % (FLUSH) 0.9 %
5-40 SYRINGE (ML) INJECTION AS NEEDED
Status: DISCONTINUED | OUTPATIENT
Start: 2021-04-27 | End: 2021-04-30 | Stop reason: HOSPADM

## 2021-04-27 RX ORDER — TRAMADOL HYDROCHLORIDE 50 MG/1
50 TABLET ORAL
Status: DISCONTINUED | OUTPATIENT
Start: 2021-04-27 | End: 2021-04-28 | Stop reason: SDUPTHER

## 2021-04-27 RX ORDER — OXYCODONE HYDROCHLORIDE 5 MG/1
10 TABLET ORAL
Status: DISCONTINUED | OUTPATIENT
Start: 2021-04-27 | End: 2021-04-29

## 2021-04-27 RX ORDER — KETOROLAC TROMETHAMINE 30 MG/ML
30 INJECTION, SOLUTION INTRAMUSCULAR; INTRAVENOUS
Status: COMPLETED | OUTPATIENT
Start: 2021-04-27 | End: 2021-04-27

## 2021-04-27 RX ORDER — FENTANYL CITRATE 50 UG/ML
INJECTION, SOLUTION INTRAMUSCULAR; INTRAVENOUS AS NEEDED
Status: DISCONTINUED | OUTPATIENT
Start: 2021-04-27 | End: 2021-04-27 | Stop reason: HOSPADM

## 2021-04-27 RX ORDER — FLUMAZENIL 0.1 MG/ML
0.2 INJECTION INTRAVENOUS
Status: DISCONTINUED | OUTPATIENT
Start: 2021-04-27 | End: 2021-04-27 | Stop reason: HOSPADM

## 2021-04-27 RX ORDER — CYCLOBENZAPRINE HCL 10 MG
10 TABLET ORAL
Status: DISCONTINUED | OUTPATIENT
Start: 2021-04-27 | End: 2021-04-28

## 2021-04-27 RX ORDER — LIDOCAINE HYDROCHLORIDE 20 MG/ML
INJECTION, SOLUTION EPIDURAL; INFILTRATION; INTRACAUDAL; PERINEURAL AS NEEDED
Status: DISCONTINUED | OUTPATIENT
Start: 2021-04-27 | End: 2021-04-27 | Stop reason: HOSPADM

## 2021-04-27 RX ORDER — PROPOFOL 10 MG/ML
INJECTION, EMULSION INTRAVENOUS
Status: DISCONTINUED | OUTPATIENT
Start: 2021-04-27 | End: 2021-04-27 | Stop reason: HOSPADM

## 2021-04-27 RX ORDER — SODIUM CHLORIDE, SODIUM LACTATE, POTASSIUM CHLORIDE, CALCIUM CHLORIDE 600; 310; 30; 20 MG/100ML; MG/100ML; MG/100ML; MG/100ML
100 INJECTION, SOLUTION INTRAVENOUS CONTINUOUS
Status: DISCONTINUED | OUTPATIENT
Start: 2021-04-27 | End: 2021-04-27 | Stop reason: HOSPADM

## 2021-04-27 RX ORDER — AMOXICILLIN 250 MG
1 CAPSULE ORAL 2 TIMES DAILY
Status: DISCONTINUED | OUTPATIENT
Start: 2021-04-27 | End: 2021-04-28

## 2021-04-27 RX ORDER — ROCURONIUM BROMIDE 10 MG/ML
INJECTION, SOLUTION INTRAVENOUS AS NEEDED
Status: DISCONTINUED | OUTPATIENT
Start: 2021-04-27 | End: 2021-04-27 | Stop reason: HOSPADM

## 2021-04-27 RX ORDER — ONDANSETRON 2 MG/ML
4 INJECTION INTRAMUSCULAR; INTRAVENOUS
Status: DISCONTINUED | OUTPATIENT
Start: 2021-04-27 | End: 2021-04-28

## 2021-04-27 RX ORDER — KETOROLAC TROMETHAMINE 30 MG/ML
30 INJECTION, SOLUTION INTRAMUSCULAR; INTRAVENOUS EVERY 6 HOURS
Status: CANCELLED | OUTPATIENT
Start: 2021-04-27 | End: 2021-04-28

## 2021-04-27 RX ORDER — POLYETHYLENE GLYCOL 3350 17 G/17G
17 POWDER, FOR SOLUTION ORAL DAILY
Status: DISCONTINUED | OUTPATIENT
Start: 2021-04-28 | End: 2021-04-28 | Stop reason: SDUPTHER

## 2021-04-27 RX ORDER — DIPHENHYDRAMINE HYDROCHLORIDE 50 MG/ML
12.5 INJECTION, SOLUTION INTRAMUSCULAR; INTRAVENOUS
Status: ACTIVE | OUTPATIENT
Start: 2021-04-27 | End: 2021-04-28

## 2021-04-27 RX ORDER — OXYCODONE HYDROCHLORIDE 5 MG/1
5 TABLET ORAL
Status: DISCONTINUED | OUTPATIENT
Start: 2021-04-27 | End: 2021-04-30 | Stop reason: HOSPADM

## 2021-04-27 RX ADMIN — FENTANYL CITRATE 100 MCG: 0.05 INJECTION, SOLUTION INTRAMUSCULAR; INTRAVENOUS at 07:52

## 2021-04-27 RX ADMIN — SODIUM CHLORIDE, POTASSIUM CHLORIDE, SODIUM LACTATE AND CALCIUM CHLORIDE 125 ML/HR: 600; 310; 30; 20 INJECTION, SOLUTION INTRAVENOUS at 12:24

## 2021-04-27 RX ADMIN — Medication 100 MCG: at 09:25

## 2021-04-27 RX ADMIN — HYDROMORPHONE HYDROCHLORIDE 0.5 MG: 1 INJECTION, SOLUTION INTRAMUSCULAR; INTRAVENOUS; SUBCUTANEOUS at 11:57

## 2021-04-27 RX ADMIN — FAMOTIDINE 20 MG: 10 INJECTION INTRAVENOUS at 07:52

## 2021-04-27 RX ADMIN — CYCLOBENZAPRINE 10 MG: 10 TABLET, FILM COATED ORAL at 15:04

## 2021-04-27 RX ADMIN — HYDROMORPHONE HYDROCHLORIDE 0.5 MG: 1 INJECTION, SOLUTION INTRAMUSCULAR; INTRAVENOUS; SUBCUTANEOUS at 11:32

## 2021-04-27 RX ADMIN — FAMOTIDINE 20 MG: 20 TABLET, FILM COATED ORAL at 17:31

## 2021-04-27 RX ADMIN — ATORVASTATIN CALCIUM 40 MG: 20 TABLET, FILM COATED ORAL at 22:49

## 2021-04-27 RX ADMIN — KETOROLAC TROMETHAMINE 30 MG: 30 INJECTION, SOLUTION INTRAMUSCULAR at 11:38

## 2021-04-27 RX ADMIN — Medication 80 MCG: at 10:05

## 2021-04-27 RX ADMIN — SODIUM CHLORIDE 125 ML/HR: 9 INJECTION, SOLUTION INTRAVENOUS at 15:00

## 2021-04-27 RX ADMIN — FENTANYL CITRATE 50 MCG: 0.05 INJECTION, SOLUTION INTRAMUSCULAR; INTRAVENOUS at 10:35

## 2021-04-27 RX ADMIN — MIDAZOLAM HYDROCHLORIDE 2 MG: 2 INJECTION, SOLUTION INTRAMUSCULAR; INTRAVENOUS at 07:52

## 2021-04-27 RX ADMIN — ROCURONIUM BROMIDE 5 MG: 10 INJECTION INTRAVENOUS at 07:52

## 2021-04-27 RX ADMIN — Medication 100 MCG: at 08:12

## 2021-04-27 RX ADMIN — SODIUM CHLORIDE, POTASSIUM CHLORIDE, SODIUM LACTATE AND CALCIUM CHLORIDE: 600; 310; 30; 20 INJECTION, SOLUTION INTRAVENOUS at 06:30

## 2021-04-27 RX ADMIN — CEFAZOLIN 2 G: 1 INJECTION, POWDER, FOR SOLUTION INTRAMUSCULAR; INTRAVENOUS at 22:48

## 2021-04-27 RX ADMIN — ROCURONIUM BROMIDE 30 MG: 10 INJECTION INTRAVENOUS at 08:18

## 2021-04-27 RX ADMIN — ACETAMINOPHEN 1000 MG: 500 TABLET, FILM COATED ORAL at 17:31

## 2021-04-27 RX ADMIN — SODIUM CHLORIDE, POTASSIUM CHLORIDE, SODIUM LACTATE AND CALCIUM CHLORIDE 125 ML/HR: 600; 310; 30; 20 INJECTION, SOLUTION INTRAVENOUS at 06:40

## 2021-04-27 RX ADMIN — DOCUSATE SODIUM 50MG AND SENNOSIDES 8.6MG 1 TABLET: 8.6; 5 TABLET, FILM COATED ORAL at 17:31

## 2021-04-27 RX ADMIN — DEXAMETHASONE SODIUM PHOSPHATE 10 MG: 4 INJECTION, SOLUTION INTRAMUSCULAR; INTRAVENOUS at 08:31

## 2021-04-27 RX ADMIN — SUCCINYLCHOLINE CHLORIDE 100 MG: 20 INJECTION, SOLUTION INTRAMUSCULAR; INTRAVENOUS at 07:56

## 2021-04-27 RX ADMIN — HYDROMORPHONE HYDROCHLORIDE 0.5 MG: 1 INJECTION, SOLUTION INTRAMUSCULAR; INTRAVENOUS; SUBCUTANEOUS at 11:13

## 2021-04-27 RX ADMIN — ROCURONIUM BROMIDE 15 MG: 10 INJECTION INTRAVENOUS at 09:00

## 2021-04-27 RX ADMIN — CEFAZOLIN SODIUM 2 G: 1 POWDER, FOR SOLUTION INTRAMUSCULAR; INTRAVENOUS at 08:18

## 2021-04-27 RX ADMIN — ONDANSETRON HYDROCHLORIDE 4 MG: 2 SOLUTION INTRAMUSCULAR; INTRAVENOUS at 10:35

## 2021-04-27 RX ADMIN — KETOROLAC TROMETHAMINE 30 MG: 30 INJECTION INTRAMUSCULAR; INTRAVENOUS at 10:35

## 2021-04-27 RX ADMIN — ONDANSETRON HYDROCHLORIDE 4 MG: 2 SOLUTION INTRAMUSCULAR; INTRAVENOUS at 08:31

## 2021-04-27 RX ADMIN — PROPOFOL 150 MCG/KG/MIN: 10 INJECTION, EMULSION INTRAVENOUS at 08:00

## 2021-04-27 RX ADMIN — OXYCODONE HYDROCHLORIDE 10 MG: 5 TABLET ORAL at 18:55

## 2021-04-27 RX ADMIN — LIDOCAINE HYDROCHLORIDE 100 MG: 20 INJECTION, SOLUTION EPIDURAL; INFILTRATION; INTRACAUDAL; PERINEURAL at 07:55

## 2021-04-27 RX ADMIN — CEFAZOLIN 2 G: 1 INJECTION, POWDER, FOR SOLUTION INTRAMUSCULAR; INTRAVENOUS at 15:04

## 2021-04-27 RX ADMIN — Medication 10 ML: at 22:49

## 2021-04-27 RX ADMIN — FENTANYL CITRATE 50 MCG: 0.05 INJECTION, SOLUTION INTRAMUSCULAR; INTRAVENOUS at 08:30

## 2021-04-27 RX ADMIN — PROPOFOL 200 MG: 10 INJECTION, EMULSION INTRAVENOUS at 07:55

## 2021-04-27 NOTE — PROGRESS NOTES
Date of Surgery Update:  Adrienne Winters was seen and examined. History and physical has been reviewed. The patient has been examined. There have been no significant clinical changes since the completion of the originally dated History and Physical.  Patient identified by surgeon; surgical site was confirmed by patient and surgeon. Patient reports intermittent chronic dysphagia, not bad but occurred since last acdf. She has chronic numbness in left C6, then pain in C8 distribution left greater than right.          Signed By: Lyn Gonzales MD     April 27, 2021 7:45 AM

## 2021-04-27 NOTE — PROGRESS NOTES
Patient seen in recovery room  preop arm pain difficult to evaluate, will reassess  Extubated uneventfully  Postop pain controlled with PO and IV medications in PACU    Patient Vitals for the past 4 hrs:   Temp Pulse Resp BP SpO2   04/27/21 1130 -- 71 15 118/79 99 %   04/27/21 1125 -- 71 20 119/75 100 %   04/27/21 1120 -- 71 21 120/75 99 %   04/27/21 1115 -- 75 (!) 31 115/79 98 %   04/27/21 1110 -- 75 10 126/79 96 %   04/27/21 1105 97.5 °F (36.4 °C) 82 10 120/78 97 %       Following commands  Dressing clean and dry  hemovac in place and functioning with minimal output  Strong motor RUE and LUE, RLE and LLE   Sensation grossly intact to LT     Stable postop ACDF  -discussed surgery with family, answered questions  -periop antibiotics  -SCD's  -advance diet as tolerated (clear liquid --> full liquid --> NPO at midnight)  -mobilize  -pain control with PO and IV medications  -plan for phase 2 in OR tomorrow

## 2021-04-27 NOTE — BRIEF OP NOTE
Brief Postoperative Note    Patient: Diandra Gomez  YOB: 1970  MRN: [id number]    Date of Procedure: 4/27/2021     Pre-Op Diagnosis: CERVICAL RADICULOPATHY  HX OF FUSION OF CERVICAL SPINE, postlaminectomy kyphosis C7-T1 with spondylolisthesis C7-T1,     Post-Op Diagnosis: Same as preoperative diagnosis. Procedure(s):  C4-7 ANTERIOR HARDWARE REMOVAL, C7 - T1 ANTERIOR CERVICAL DISCECTOMY AND  FUSION WITH INSTRUMENTATION, OSTEO AMP AND ILIAC CREST BONE MARROW ASPIRATE    Surgeon(s):  Nathalie Carrington MD    Surgical Assistant: Nurse Practitioner: Efren Obrien NP    Anesthesia: General     Estimated Blood Loss (mL): Minimal    Complications: None    Specimens: * No specimens in log *     Implants:   Implant Name Type Inv. Item Serial No.  Lot No. LRB No. Used Action   ViBone Moldable 2cc   [id number] [employer] N/A N/A 1 Implanted   GRAFT BNE FIBER 1 CC OSTEOAMP JOSE - [id number]  GRAFT BNE FIBER 1 CC OSTEOAMP JOSE [id number] [employer]_WD n/a N/A 1 Implanted   CAGE SPNL [id number] D12MM 6DEG ANT CERV INTBDY FUS LORD W/ - SN/A  CAGE SPNL [id number] D12MM 6DEG ANT CERV INTBDY FUS LORD W/ N/A [employer]_WD [id number] N/A 1 Implanted   PLATE SPNL 1 LEVEL 20 MM ANTR CERV CONSTRN PYRENESS - SN/A  PLATE SPNL 1 LEVEL 20 MM ANTR CERV CONSTRN PYRENESS N/A [employer] HOWM_WD N/A N/A 1 Implanted   SCREW SPNL SELF-STARTING 4X12 MM CONSTRN NS PYRENEES - SN/A  SCREW SPNL SELF-STARTING 4X12 MM CONSTRN NS PYRENEES N/A HEATHER [employer]INE HOWM_WD N/A N/A 4 Implanted       Drains:   Hemovac Left; Anterior Neck (Active)   Site Assessment Clean, dry, & intact 04/27/21 1024   Dressing Status Clean, dry, & intact 04/27/21 1024   Drainage Description Serosanguinous 04/27/21 1024   Status Patent; Charged;Draining 04/27/21 1024       Findings: C4-7 plate overlying S6-M3 disk space anteriorly, healed C4-7 grafts, mobile spondylolisthesis C7-T1. Stenosis C7-T1.      Electronically Signed by Alana Hdz MD on 4/27/2021 at 10:45 AM

## 2021-04-27 NOTE — PROGRESS NOTES
4/27/2021 4:08 PM Case management consult for discharge planning received. Reason for Admission: Elective admit under Dr. Lydia Diaz   Procedure(s):  C4-7 ANTERIOR HARDWARE REMOVAL, C7 - T1 ANTERIOR CERVICAL DISCECTOMY AND  FUSION WITH INSTRUMENTATION, OSTEO AMP AND ILIAC CREST BONE MARROW ASPIRATE     Assessment:   [x]In person with pt and pt's   Charted address and phone numbers confirmed. RUR: 6%  Risk Level: [x]Low []Moderate []High  Value-based purchasing: [] Yes [x] No  Bundle patient: [] Yes [x] No   Specify:     Advance Directive: No Order. [x] No AD on file. [] AD on file. [] Current AD not on file. Copy requested. [] Requests AD, and referral submitted to [facility]. Healthcare Decision MakerMjoanie Wilde Spouse [phone]       Assessment:    Age: 48     Sex: [] Male [x]Female     Residency: [x]Private residence []Apartment []Assisted Living []LTC []Other:   Exterior Steps: 2  Interior Steps: 1 flight to bedroom     Lives With: [x]With spouse []Other family members []Underage children []Alone []Care provider []Other:    Prior functioning:  [x]Independent with ADLs and iADLS []Dependent with ADLs and iADLs []Partial dependence, Specify:     Prior DME required:  [x]None []RW []Cane []Crutches []Bedside commode []CPAP []Home O2 (Liter/Provider: ) []Nebulizer   []Shower Chair []Wheelchair []Hospital Bed []Noreen []Stair lift []Rollator []Other:    DME available: [x]None []RW []Cane []Crutches []Bedside commode []CPAP []Home O2 (Liter/Provider: ) []Nebulizer   []Shower Chair []Wheelchair []Hospital Bed []Noreen []Stair lift []Rollator []Other:    Rehab history: [x]None []Outpatient PT []Home Health (Provider/Date: ) []SNF (Provider/Date: ) []IPR (Provider/Date: ) []LTC (Provider/Date: ) []Hospice (Provider/Date: )  []Other:     Discharge Concerns: []Yes [x]No []Unknown   Describe:    Comments:      Insurer:   Insurance Information                [facility] Phone:     Subscriber: Saurav Ochoa Subscriber#: [id number]    Group#: [id number] Precert#:           PCP: Areta Councilman   Address: [address] [facility] [place] [address] [address]   Phone number: [phone]   Current patient: [x]Yes []No   Approximate date of last visit: 2 months ago   Access to virtual PCP visits: [x]Yes []No    Pharmacy: [facility] at [address] Transport: Pt's        Transition of care plan:    [x]Unable to determine at this time. Awaiting clinical progress, and disposition recommendations. Noted pt to return to OR tomorrow for 2nd stage of procedure. Will follow for discharge recommendations.  BABATUNDE Strauss    Care Management Interventions  PCP Verified by CM: Tricia Vasquez )  Transition of Care Consult (CM Consult): Discharge Planning  MyChart Signup: No  Discharge Durable Medical Equipment: No  Physical Therapy Consult: No  Occupational Therapy Consult: No  Speech Therapy Consult: No  Current Support Network: Lives with Spouse  Discharge Location  Discharge Placement: Unable to determine at this time

## 2021-04-27 NOTE — PROGRESS NOTES
Problem: Falls - Risk of  Goal: *Absence of Falls  Description: Document Lyudmila Ruts Fall Risk and appropriate interventions in the flowsheet.   Outcome: Progressing Towards Goal  Note: Fall Risk Interventions:            Medication Interventions: Patient to call before getting OOB

## 2021-04-27 NOTE — ROUTINE PROCESS
TRANSFER - OUT REPORT:    Verbal report given to [facility] RN(name) on Jose Guadalupe Gist  being transferred to 4th floor(unit) for routine post - op       Report consisted of patients Situation, Background, Assessment and   Recommendations(SBAR). Information from the following report(s) SBAR, OR Summary, Intake/Output and MAR was reviewed with the receiving nurse. Lines:   Peripheral IV 04/27/21 Right Hand (Active)   Site Assessment Clean, dry, & intact 04/27/21 1145   Phlebitis Assessment 0 04/27/21 1145   Infiltration Assessment 0 04/27/21 1145   Dressing Status Clean, dry, & intact 04/27/21 1145   Dressing Type Tape;Transparent 04/27/21 1145   Hub Color/Line Status Pink; Infusing 04/27/21 1145   Alcohol Cap Used Yes 04/27/21 1145        Opportunity for questions and clarification was provided.       Patient transported with:   Registered Nurse  Tech

## 2021-04-28 ENCOUNTER — APPOINTMENT (OUTPATIENT)
Dept: GENERAL RADIOLOGY | Age: 51
DRG: 454 | End: 2021-04-28
Attending: ORTHOPAEDIC SURGERY
Payer: COMMERCIAL

## 2021-04-28 ENCOUNTER — ANESTHESIA (OUTPATIENT)
Dept: SURGERY | Age: 51
DRG: 454 | End: 2021-04-28
Payer: COMMERCIAL

## 2021-04-28 LAB
ANION GAP SERPL CALC-SCNC: 4 MMOL/L (ref 5–15)
BUN SERPL-MCNC: 10 MG/DL (ref 6–20)
BUN/CREAT SERPL: 16 (ref 12–20)
CALCIUM SERPL-MCNC: 8.4 MG/DL (ref 8.5–10.1)
CHLORIDE SERPL-SCNC: 110 MMOL/L (ref 97–108)
CO2 SERPL-SCNC: 23 MMOL/L (ref 21–32)
CREAT SERPL-MCNC: 0.61 MG/DL (ref 0.55–1.02)
GLUCOSE SERPL-MCNC: 93 MG/DL (ref 65–100)
HGB BLD-MCNC: 11.2 G/DL (ref 11.5–16)
POTASSIUM SERPL-SCNC: 4.1 MMOL/L (ref 3.5–5.1)
SODIUM SERPL-SCNC: 137 MMOL/L (ref 136–145)

## 2021-04-28 PROCEDURE — 77030031139 HC SUT VCRL2 J&J -A: Performed by: ORTHOPAEDIC SURGERY

## 2021-04-28 PROCEDURE — 0RG6071 FUSION OF THORACIC VERTEBRAL JOINT WITH AUTOLOGOUS TISSUE SUBSTITUTE, POSTERIOR APPROACH, POSTERIOR COLUMN, OPEN APPROACH: ICD-10-PCS | Performed by: ORTHOPAEDIC SURGERY

## 2021-04-28 PROCEDURE — 0RG2071 FUSION OF 2 OR MORE CERVICAL VERTEBRAL JOINTS WITH AUTOLOGOUS TISSUE SUBSTITUTE, POSTERIOR APPROACH, POSTERIOR COLUMN, OPEN APPROACH: ICD-10-PCS | Performed by: ORTHOPAEDIC SURGERY

## 2021-04-28 PROCEDURE — 77030040361 HC SLV COMPR DVT MDII -B

## 2021-04-28 PROCEDURE — 77030040356 HC CORD BPLR FRCP COVD -A: Performed by: ORTHOPAEDIC SURGERY

## 2021-04-28 PROCEDURE — 74011250636 HC RX REV CODE- 250/636: Performed by: NURSE ANESTHETIST, CERTIFIED REGISTERED

## 2021-04-28 PROCEDURE — 77030005513 HC CATH URETH FOL11 MDII -B: Performed by: ORTHOPAEDIC SURGERY

## 2021-04-28 PROCEDURE — 85018 HEMOGLOBIN: CPT

## 2021-04-28 PROCEDURE — 80048 BASIC METABOLIC PNL TOTAL CA: CPT

## 2021-04-28 PROCEDURE — 77030034475 HC MISC IMPL SPN: Performed by: ORTHOPAEDIC SURGERY

## 2021-04-28 PROCEDURE — 77030026438 HC STYL ET INTUB CARD -A: Performed by: NURSE ANESTHETIST, CERTIFIED REGISTERED

## 2021-04-28 PROCEDURE — 77030039147 HC PWDR HEMSTS SURGICEL JNJ -D: Performed by: ORTHOPAEDIC SURGERY

## 2021-04-28 PROCEDURE — 77030008684 HC TU ET CUF COVD -B: Performed by: NURSE ANESTHETIST, CERTIFIED REGISTERED

## 2021-04-28 PROCEDURE — 77030028271 HC SRGFL HEMSTAT MTRX KT J&J -C: Performed by: ORTHOPAEDIC SURGERY

## 2021-04-28 PROCEDURE — 74011250636 HC RX REV CODE- 250/636: Performed by: ORTHOPAEDIC SURGERY

## 2021-04-28 PROCEDURE — 77030038552 HC DRN WND MDII -A: Performed by: ORTHOPAEDIC SURGERY

## 2021-04-28 PROCEDURE — 74011250637 HC RX REV CODE- 250/637: Performed by: NURSE PRACTITIONER

## 2021-04-28 PROCEDURE — P9045 ALBUMIN (HUMAN), 5%, 250 ML: HCPCS | Performed by: NURSE ANESTHETIST, CERTIFIED REGISTERED

## 2021-04-28 PROCEDURE — 74011250636 HC RX REV CODE- 250/636: Performed by: ANESTHESIOLOGY

## 2021-04-28 PROCEDURE — 2709999900 HC NON-CHARGEABLE SUPPLY: Performed by: ORTHOPAEDIC SURGERY

## 2021-04-28 PROCEDURE — 76210000017 HC OR PH I REC 1.5 TO 2 HR: Performed by: ORTHOPAEDIC SURGERY

## 2021-04-28 PROCEDURE — 77030040922 HC BLNKT HYPOTHRM STRY -A

## 2021-04-28 PROCEDURE — 77030040951 HC GRFT BN OSTEOAMP SELCT BTUS -I: Performed by: ORTHOPAEDIC SURGERY

## 2021-04-28 PROCEDURE — 77030013079 HC BLNKT BAIR HGGR 3M -A: Performed by: NURSE ANESTHETIST, CERTIFIED REGISTERED

## 2021-04-28 PROCEDURE — 74011250636 HC RX REV CODE- 250/636: Performed by: NURSE PRACTITIONER

## 2021-04-28 PROCEDURE — 0PW304Z REVISION OF INTERNAL FIXATION DEVICE IN CERVICAL VERTEBRA, OPEN APPROACH: ICD-10-PCS | Performed by: ORTHOPAEDIC SURGERY

## 2021-04-28 PROCEDURE — 77030020061 HC IV BLD WRMR ADMIN SET 3M -B: Performed by: NURSE ANESTHETIST, CERTIFIED REGISTERED

## 2021-04-28 PROCEDURE — 74011000250 HC RX REV CODE- 250: Performed by: NURSE PRACTITIONER

## 2021-04-28 PROCEDURE — 36415 COLL VENOUS BLD VENIPUNCTURE: CPT

## 2021-04-28 PROCEDURE — 77030041279 HC DRSG PRMSL AG MDII -B: Performed by: ORTHOPAEDIC SURGERY

## 2021-04-28 PROCEDURE — 65270000029 HC RM PRIVATE

## 2021-04-28 PROCEDURE — 74011250636 HC RX REV CODE- 250/636: Performed by: STUDENT IN AN ORGANIZED HEALTH CARE EDUCATION/TRAINING PROGRAM

## 2021-04-28 PROCEDURE — 0RG4071 FUSION OF CERVICOTHORACIC VERTEBRAL JOINT WITH AUTOLOGOUS TISSUE SUBSTITUTE, POSTERIOR APPROACH, POSTERIOR COLUMN, OPEN APPROACH: ICD-10-PCS | Performed by: ORTHOPAEDIC SURGERY

## 2021-04-28 PROCEDURE — 74011000250 HC RX REV CODE- 250: Performed by: NURSE ANESTHETIST, CERTIFIED REGISTERED

## 2021-04-28 PROCEDURE — 77030019908 HC STETH ESOPH SIMS -A: Performed by: NURSE ANESTHETIST, CERTIFIED REGISTERED

## 2021-04-28 PROCEDURE — 77030040466 HC GRFT MATRIX VIBONE REGE -I1: Performed by: ORTHOPAEDIC SURGERY

## 2021-04-28 PROCEDURE — 76060000041 HC ANESTHESIA 5 TO 5.5 HR: Performed by: ORTHOPAEDIC SURGERY

## 2021-04-28 PROCEDURE — 74011000250 HC RX REV CODE- 250: Performed by: ORTHOPAEDIC SURGERY

## 2021-04-28 PROCEDURE — C1713 ANCHOR/SCREW BN/BN,TIS/BN: HCPCS | Performed by: ORTHOPAEDIC SURGERY

## 2021-04-28 PROCEDURE — 77030030722 HC PIN SKULL MAYFLD INLC -B: Performed by: ORTHOPAEDIC SURGERY

## 2021-04-28 PROCEDURE — C9290 INJ, BUPIVACAINE LIPOSOME: HCPCS | Performed by: ORTHOPAEDIC SURGERY

## 2021-04-28 PROCEDURE — 77030004391 HC BUR FLUT MEDT -C: Performed by: ORTHOPAEDIC SURGERY

## 2021-04-28 PROCEDURE — 77030020556: Performed by: ORTHOPAEDIC SURGERY

## 2021-04-28 PROCEDURE — 77030025623 HC BUR RND PRECIS STRY -D: Performed by: ORTHOPAEDIC SURGERY

## 2021-04-28 PROCEDURE — 77030011267 HC ELECTRD BLD COVD -A: Performed by: ORTHOPAEDIC SURGERY

## 2021-04-28 PROCEDURE — 77030021678 HC GLIDESCP STAT DISP VERT -B: Performed by: NURSE ANESTHETIST, CERTIFIED REGISTERED

## 2021-04-28 PROCEDURE — 76010000177 HC OR TIME 5 TO 5.5 HR INTENSV-TIER 1: Performed by: ORTHOPAEDIC SURGERY

## 2021-04-28 PROCEDURE — 77030029099 HC BN WAX SSPC -A: Performed by: ORTHOPAEDIC SURGERY

## 2021-04-28 PROCEDURE — 74011250636 HC RX REV CODE- 250/636: Performed by: PHYSICIAN ASSISTANT

## 2021-04-28 DEVICE — GRAFT BONE 10 CC: Type: IMPLANTABLE DEVICE | Site: SPINE CERVICAL | Status: FUNCTIONAL

## 2021-04-28 DEVICE — SCR SET CERV LCK -- STREAMLINE CT: Type: IMPLANTABLE DEVICE | Site: SPINE CERVICAL | Status: FUNCTIONAL

## 2021-04-28 DEVICE — QUARTEX THREADED LOCKING CAP
Type: IMPLANTABLE DEVICE | Site: SPINE CERVICAL | Status: FUNCTIONAL
Brand: QUARTEX

## 2021-04-28 DEVICE — Z DUP USE 2731790 SUBSTITUTE BNE 10CC VIABLE MTRX VIBNE: Type: IMPLANTABLE DEVICE | Site: SPINE CERVICAL | Status: FUNCTIONAL

## 2021-04-28 RX ORDER — SODIUM CHLORIDE 0.9 % (FLUSH) 0.9 %
5-40 SYRINGE (ML) INJECTION AS NEEDED
Status: DISCONTINUED | OUTPATIENT
Start: 2021-04-28 | End: 2021-04-30 | Stop reason: HOSPADM

## 2021-04-28 RX ORDER — EPHEDRINE SULFATE/0.9% NACL/PF 50 MG/5 ML
SYRINGE (ML) INTRAVENOUS AS NEEDED
Status: DISCONTINUED | OUTPATIENT
Start: 2021-04-28 | End: 2021-04-28 | Stop reason: HOSPADM

## 2021-04-28 RX ORDER — MAGNESIUM SULFATE HEPTAHYDRATE 40 MG/ML
INJECTION, SOLUTION INTRAVENOUS AS NEEDED
Status: DISCONTINUED | OUTPATIENT
Start: 2021-04-28 | End: 2021-04-28 | Stop reason: HOSPADM

## 2021-04-28 RX ORDER — KETOROLAC TROMETHAMINE 30 MG/ML
30 INJECTION, SOLUTION INTRAMUSCULAR; INTRAVENOUS EVERY 6 HOURS
Status: COMPLETED | OUTPATIENT
Start: 2021-04-28 | End: 2021-04-29

## 2021-04-28 RX ORDER — KETAMINE HYDROCHLORIDE 10 MG/ML
INJECTION, SOLUTION INTRAMUSCULAR; INTRAVENOUS AS NEEDED
Status: DISCONTINUED | OUTPATIENT
Start: 2021-04-28 | End: 2021-04-28 | Stop reason: HOSPADM

## 2021-04-28 RX ORDER — NALOXONE HYDROCHLORIDE 0.4 MG/ML
0.4 INJECTION, SOLUTION INTRAMUSCULAR; INTRAVENOUS; SUBCUTANEOUS AS NEEDED
Status: DISCONTINUED | OUTPATIENT
Start: 2021-04-28 | End: 2021-04-30 | Stop reason: HOSPADM

## 2021-04-28 RX ORDER — LORAZEPAM 2 MG/ML
0.5 INJECTION INTRAMUSCULAR
Status: DISCONTINUED | OUTPATIENT
Start: 2021-04-28 | End: 2021-04-28 | Stop reason: HOSPADM

## 2021-04-28 RX ORDER — SODIUM CHLORIDE, SODIUM LACTATE, POTASSIUM CHLORIDE, CALCIUM CHLORIDE 600; 310; 30; 20 MG/100ML; MG/100ML; MG/100ML; MG/100ML
125 INJECTION, SOLUTION INTRAVENOUS CONTINUOUS
Status: DISCONTINUED | OUTPATIENT
Start: 2021-04-28 | End: 2021-04-28 | Stop reason: HOSPADM

## 2021-04-28 RX ORDER — ALBUMIN HUMAN 50 G/1000ML
SOLUTION INTRAVENOUS AS NEEDED
Status: DISCONTINUED | OUTPATIENT
Start: 2021-04-28 | End: 2021-04-28 | Stop reason: HOSPADM

## 2021-04-28 RX ORDER — SUCCINYLCHOLINE CHLORIDE 20 MG/ML
INJECTION INTRAMUSCULAR; INTRAVENOUS AS NEEDED
Status: DISCONTINUED | OUTPATIENT
Start: 2021-04-28 | End: 2021-04-28 | Stop reason: HOSPADM

## 2021-04-28 RX ORDER — ACETAMINOPHEN 500 MG
1000 TABLET ORAL EVERY 6 HOURS
Status: DISCONTINUED | OUTPATIENT
Start: 2021-04-28 | End: 2021-04-30 | Stop reason: HOSPADM

## 2021-04-28 RX ORDER — LIDOCAINE HYDROCHLORIDE 20 MG/ML
INJECTION, SOLUTION EPIDURAL; INFILTRATION; INTRACAUDAL; PERINEURAL AS NEEDED
Status: DISCONTINUED | OUTPATIENT
Start: 2021-04-28 | End: 2021-04-28 | Stop reason: HOSPADM

## 2021-04-28 RX ORDER — SODIUM CHLORIDE 9 MG/ML
125 INJECTION, SOLUTION INTRAVENOUS CONTINUOUS
Status: DISPENSED | OUTPATIENT
Start: 2021-04-28 | End: 2021-04-29

## 2021-04-28 RX ORDER — FAMOTIDINE 20 MG/1
20 TABLET, FILM COATED ORAL 2 TIMES DAILY
Status: DISCONTINUED | OUTPATIENT
Start: 2021-04-28 | End: 2021-04-30 | Stop reason: HOSPADM

## 2021-04-28 RX ORDER — OXYCODONE HYDROCHLORIDE 5 MG/1
5 TABLET ORAL
Status: DISCONTINUED | OUTPATIENT
Start: 2021-04-28 | End: 2021-04-28 | Stop reason: SDUPTHER

## 2021-04-28 RX ORDER — FENTANYL CITRATE 50 UG/ML
INJECTION, SOLUTION INTRAMUSCULAR; INTRAVENOUS AS NEEDED
Status: DISCONTINUED | OUTPATIENT
Start: 2021-04-28 | End: 2021-04-28 | Stop reason: HOSPADM

## 2021-04-28 RX ORDER — GLYCOPYRROLATE 0.2 MG/ML
INJECTION INTRAMUSCULAR; INTRAVENOUS AS NEEDED
Status: DISCONTINUED | OUTPATIENT
Start: 2021-04-28 | End: 2021-04-28 | Stop reason: HOSPADM

## 2021-04-28 RX ORDER — LIDOCAINE HYDROCHLORIDE 10 MG/ML
0.1 INJECTION, SOLUTION EPIDURAL; INFILTRATION; INTRACAUDAL; PERINEURAL AS NEEDED
Status: DISCONTINUED | OUTPATIENT
Start: 2021-04-28 | End: 2021-04-28 | Stop reason: HOSPADM

## 2021-04-28 RX ORDER — PROPOFOL 10 MG/ML
VIAL (ML) INTRAVENOUS
Status: DISCONTINUED | OUTPATIENT
Start: 2021-04-28 | End: 2021-04-28 | Stop reason: HOSPADM

## 2021-04-28 RX ORDER — OXYCODONE HYDROCHLORIDE 5 MG/1
10 TABLET ORAL
Status: DISCONTINUED | OUTPATIENT
Start: 2021-04-28 | End: 2021-04-28 | Stop reason: SDUPTHER

## 2021-04-28 RX ORDER — AMOXICILLIN 250 MG
1 CAPSULE ORAL 2 TIMES DAILY
Status: DISCONTINUED | OUTPATIENT
Start: 2021-04-28 | End: 2021-04-30 | Stop reason: HOSPADM

## 2021-04-28 RX ORDER — HYDROMORPHONE HYDROCHLORIDE 1 MG/ML
.5-1 INJECTION, SOLUTION INTRAMUSCULAR; INTRAVENOUS; SUBCUTANEOUS
Status: DISCONTINUED | OUTPATIENT
Start: 2021-04-28 | End: 2021-04-28 | Stop reason: HOSPADM

## 2021-04-28 RX ORDER — DEXAMETHASONE SODIUM PHOSPHATE 4 MG/ML
INJECTION, SOLUTION INTRA-ARTICULAR; INTRALESIONAL; INTRAMUSCULAR; INTRAVENOUS; SOFT TISSUE AS NEEDED
Status: DISCONTINUED | OUTPATIENT
Start: 2021-04-28 | End: 2021-04-28 | Stop reason: HOSPADM

## 2021-04-28 RX ORDER — ONDANSETRON 2 MG/ML
4 INJECTION INTRAMUSCULAR; INTRAVENOUS
Status: ACTIVE | OUTPATIENT
Start: 2021-04-28 | End: 2021-04-29

## 2021-04-28 RX ORDER — PROPOFOL 10 MG/ML
INJECTION, EMULSION INTRAVENOUS AS NEEDED
Status: DISCONTINUED | OUTPATIENT
Start: 2021-04-28 | End: 2021-04-28 | Stop reason: HOSPADM

## 2021-04-28 RX ORDER — MIDAZOLAM HYDROCHLORIDE 1 MG/ML
INJECTION, SOLUTION INTRAMUSCULAR; INTRAVENOUS AS NEEDED
Status: DISCONTINUED | OUTPATIENT
Start: 2021-04-28 | End: 2021-04-28 | Stop reason: HOSPADM

## 2021-04-28 RX ORDER — CYCLOBENZAPRINE HCL 10 MG
10 TABLET ORAL
Status: DISCONTINUED | OUTPATIENT
Start: 2021-04-28 | End: 2021-04-29

## 2021-04-28 RX ORDER — SODIUM CHLORIDE 0.9 % (FLUSH) 0.9 %
5-40 SYRINGE (ML) INJECTION EVERY 8 HOURS
Status: DISCONTINUED | OUTPATIENT
Start: 2021-04-28 | End: 2021-04-30 | Stop reason: HOSPADM

## 2021-04-28 RX ORDER — DIAZEPAM 5 MG/1
5 TABLET ORAL
Status: DISCONTINUED | OUTPATIENT
Start: 2021-04-28 | End: 2021-04-30 | Stop reason: HOSPADM

## 2021-04-28 RX ORDER — ONDANSETRON 2 MG/ML
INJECTION INTRAMUSCULAR; INTRAVENOUS AS NEEDED
Status: DISCONTINUED | OUTPATIENT
Start: 2021-04-28 | End: 2021-04-28 | Stop reason: HOSPADM

## 2021-04-28 RX ORDER — POLYETHYLENE GLYCOL 3350 17 G/17G
17 POWDER, FOR SOLUTION ORAL DAILY
Status: DISCONTINUED | OUTPATIENT
Start: 2021-04-29 | End: 2021-04-30 | Stop reason: HOSPADM

## 2021-04-28 RX ORDER — DEXAMETHASONE SODIUM PHOSPHATE 10 MG/ML
4 INJECTION INTRAMUSCULAR; INTRAVENOUS EVERY 8 HOURS
Status: COMPLETED | OUTPATIENT
Start: 2021-04-28 | End: 2021-04-29

## 2021-04-28 RX ORDER — ONDANSETRON 2 MG/ML
4 INJECTION INTRAMUSCULAR; INTRAVENOUS AS NEEDED
Status: DISCONTINUED | OUTPATIENT
Start: 2021-04-28 | End: 2021-04-28 | Stop reason: HOSPADM

## 2021-04-28 RX ORDER — SODIUM CHLORIDE 9 MG/ML
INJECTION, SOLUTION INTRAVENOUS
Status: DISCONTINUED | OUTPATIENT
Start: 2021-04-28 | End: 2021-04-28 | Stop reason: HOSPADM

## 2021-04-28 RX ORDER — FAMOTIDINE 10 MG/ML
INJECTION INTRAVENOUS AS NEEDED
Status: DISCONTINUED | OUTPATIENT
Start: 2021-04-28 | End: 2021-04-28 | Stop reason: HOSPADM

## 2021-04-28 RX ORDER — MORPHINE SULFATE 2 MG/ML
2 INJECTION, SOLUTION INTRAMUSCULAR; INTRAVENOUS
Status: DISCONTINUED | OUTPATIENT
Start: 2021-04-28 | End: 2021-04-28

## 2021-04-28 RX ORDER — NEOSTIGMINE METHYLSULFATE 1 MG/ML
INJECTION, SOLUTION INTRAVENOUS AS NEEDED
Status: DISCONTINUED | OUTPATIENT
Start: 2021-04-28 | End: 2021-04-28 | Stop reason: HOSPADM

## 2021-04-28 RX ORDER — PHENYLEPHRINE HCL IN 0.9% NACL 0.4MG/10ML
SYRINGE (ML) INTRAVENOUS AS NEEDED
Status: DISCONTINUED | OUTPATIENT
Start: 2021-04-28 | End: 2021-04-28 | Stop reason: HOSPADM

## 2021-04-28 RX ORDER — FACIAL-BODY WIPES
10 EACH TOPICAL DAILY PRN
Status: DISCONTINUED | OUTPATIENT
Start: 2021-04-30 | End: 2021-04-30 | Stop reason: HOSPADM

## 2021-04-28 RX ORDER — ROCURONIUM BROMIDE 10 MG/ML
INJECTION, SOLUTION INTRAVENOUS AS NEEDED
Status: DISCONTINUED | OUTPATIENT
Start: 2021-04-28 | End: 2021-04-28 | Stop reason: HOSPADM

## 2021-04-28 RX ORDER — TRAMADOL HYDROCHLORIDE 50 MG/1
50 TABLET ORAL
Status: DISCONTINUED | OUTPATIENT
Start: 2021-04-28 | End: 2021-04-30 | Stop reason: HOSPADM

## 2021-04-28 RX ORDER — SUFENTANIL CITRATE 50 UG/ML
INJECTION EPIDURAL; INTRAVENOUS
Status: DISCONTINUED | OUTPATIENT
Start: 2021-04-28 | End: 2021-04-28 | Stop reason: HOSPADM

## 2021-04-28 RX ORDER — HYDROMORPHONE HYDROCHLORIDE 1 MG/ML
1 INJECTION, SOLUTION INTRAMUSCULAR; INTRAVENOUS; SUBCUTANEOUS
Status: DISCONTINUED | OUTPATIENT
Start: 2021-04-28 | End: 2021-04-29

## 2021-04-28 RX ADMIN — CEFAZOLIN SODIUM 2 G: 1 POWDER, FOR SOLUTION INTRAMUSCULAR; INTRAVENOUS at 12:45

## 2021-04-28 RX ADMIN — MORPHINE SULFATE 2 MG: 2 INJECTION, SOLUTION INTRAMUSCULAR; INTRAVENOUS at 17:25

## 2021-04-28 RX ADMIN — Medication 100 MCG: at 10:52

## 2021-04-28 RX ADMIN — Medication 10 MG: at 12:35

## 2021-04-28 RX ADMIN — PROPOFOL 50 MG: 10 INJECTION, EMULSION INTRAVENOUS at 09:02

## 2021-04-28 RX ADMIN — PROPOFOL 150 MCG/KG/MIN: 10 INJECTION, EMULSION INTRAVENOUS at 08:49

## 2021-04-28 RX ADMIN — SODIUM CHLORIDE, POTASSIUM CHLORIDE, SODIUM LACTATE AND CALCIUM CHLORIDE: 600; 310; 30; 20 INJECTION, SOLUTION INTRAVENOUS at 11:46

## 2021-04-28 RX ADMIN — MAGNESIUM SULFATE 2 G: 2 INJECTION INTRAVENOUS at 09:52

## 2021-04-28 RX ADMIN — GLYCOPYRROLATE 0.2 MG: 0.2 INJECTION INTRAMUSCULAR; INTRAVENOUS at 12:29

## 2021-04-28 RX ADMIN — OXYCODONE HYDROCHLORIDE 10 MG: 5 TABLET ORAL at 22:06

## 2021-04-28 RX ADMIN — ACETAMINOPHEN 1000 MG: 500 TABLET, FILM COATED ORAL at 05:50

## 2021-04-28 RX ADMIN — PROPOFOL 150 MG: 10 INJECTION, EMULSION INTRAVENOUS at 08:49

## 2021-04-28 RX ADMIN — DEXAMETHASONE SODIUM PHOSPHATE 4 MG: 10 INJECTION, SOLUTION INTRAMUSCULAR; INTRAVENOUS at 22:05

## 2021-04-28 RX ADMIN — CYCLOBENZAPRINE 10 MG: 10 TABLET, FILM COATED ORAL at 17:06

## 2021-04-28 RX ADMIN — ACETAMINOPHEN 1000 MG: 500 TABLET, FILM COATED ORAL at 17:06

## 2021-04-28 RX ADMIN — HYDROMORPHONE HYDROCHLORIDE 1 MG: 1 INJECTION, SOLUTION INTRAMUSCULAR; INTRAVENOUS; SUBCUTANEOUS at 14:12

## 2021-04-28 RX ADMIN — OXYCODONE 5 MG: 5 TABLET ORAL at 05:50

## 2021-04-28 RX ADMIN — ALBUMIN (HUMAN) 250 ML: 12.5 SOLUTION INTRAVENOUS at 11:59

## 2021-04-28 RX ADMIN — Medication 3 MG: at 12:29

## 2021-04-28 RX ADMIN — Medication 10 ML: at 22:05

## 2021-04-28 RX ADMIN — Medication 100 MCG: at 09:00

## 2021-04-28 RX ADMIN — ACETAMINOPHEN 1000 MG: 500 TABLET, FILM COATED ORAL at 00:06

## 2021-04-28 RX ADMIN — SUFENTANIL CITRATE 0.2 MCG/KG/HR: 50 INJECTION EPIDURAL; INTRAVENOUS at 08:49

## 2021-04-28 RX ADMIN — SODIUM CHLORIDE, POTASSIUM CHLORIDE, SODIUM LACTATE AND CALCIUM CHLORIDE 125 ML/HR: 600; 310; 30; 20 INJECTION, SOLUTION INTRAVENOUS at 08:20

## 2021-04-28 RX ADMIN — ATORVASTATIN CALCIUM 40 MG: 20 TABLET, FILM COATED ORAL at 22:05

## 2021-04-28 RX ADMIN — ROCURONIUM BROMIDE 50 MG: 10 INJECTION INTRAVENOUS at 09:02

## 2021-04-28 RX ADMIN — HYDROMORPHONE HYDROCHLORIDE 1 MG: 1 INJECTION, SOLUTION INTRAMUSCULAR; INTRAVENOUS; SUBCUTANEOUS at 19:54

## 2021-04-28 RX ADMIN — Medication 100 MCG: at 12:17

## 2021-04-28 RX ADMIN — Medication 100 MCG: at 12:36

## 2021-04-28 RX ADMIN — Medication 100 MCG: at 13:04

## 2021-04-28 RX ADMIN — LORAZEPAM 0.5 MG: 2 INJECTION INTRAMUSCULAR; INTRAVENOUS at 14:35

## 2021-04-28 RX ADMIN — KETOROLAC TROMETHAMINE 30 MG: 30 INJECTION, SOLUTION INTRAMUSCULAR at 17:06

## 2021-04-28 RX ADMIN — LIDOCAINE HYDROCHLORIDE 100 MG: 20 INJECTION, SOLUTION EPIDURAL; INFILTRATION; INTRACAUDAL; PERINEURAL at 08:49

## 2021-04-28 RX ADMIN — OXYCODONE HYDROCHLORIDE 10 MG: 5 TABLET ORAL at 15:28

## 2021-04-28 RX ADMIN — ROCURONIUM BROMIDE 30 MG: 10 INJECTION INTRAVENOUS at 09:27

## 2021-04-28 RX ADMIN — DOCUSATE SODIUM 50MG AND SENNOSIDES 8.6MG 1 TABLET: 8.6; 5 TABLET, FILM COATED ORAL at 17:06

## 2021-04-28 RX ADMIN — Medication 100 MCG: at 13:09

## 2021-04-28 RX ADMIN — Medication 100 MCG: at 09:14

## 2021-04-28 RX ADMIN — FENTANYL CITRATE 100 MCG: 0.05 INJECTION, SOLUTION INTRAMUSCULAR; INTRAVENOUS at 08:49

## 2021-04-28 RX ADMIN — DEXAMETHASONE SODIUM PHOSPHATE 4 MG: 10 INJECTION, SOLUTION INTRAMUSCULAR; INTRAVENOUS at 17:06

## 2021-04-28 RX ADMIN — KETAMINE HYDROCHLORIDE 25 MG: 10 INJECTION INTRAMUSCULAR; INTRAVENOUS at 09:00

## 2021-04-28 RX ADMIN — ONDANSETRON HYDROCHLORIDE 4 MG: 2 SOLUTION INTRAMUSCULAR; INTRAVENOUS at 12:47

## 2021-04-28 RX ADMIN — Medication 10 ML: at 05:51

## 2021-04-28 RX ADMIN — MIDAZOLAM HYDROCHLORIDE 2 MG: 2 INJECTION, SOLUTION INTRAMUSCULAR; INTRAVENOUS at 08:41

## 2021-04-28 RX ADMIN — CEFAZOLIN 2 G: 1 INJECTION, POWDER, FOR SOLUTION INTRAMUSCULAR; INTRAVENOUS at 19:54

## 2021-04-28 RX ADMIN — FAMOTIDINE 20 MG: 10 INJECTION INTRAVENOUS at 09:52

## 2021-04-28 RX ADMIN — KETAMINE HYDROCHLORIDE 25 MG: 10 INJECTION INTRAMUSCULAR; INTRAVENOUS at 10:21

## 2021-04-28 RX ADMIN — SODIUM CHLORIDE: 900 INJECTION, SOLUTION INTRAVENOUS at 09:00

## 2021-04-28 RX ADMIN — SUCCINYLCHOLINE CHLORIDE 100 MG: 20 INJECTION, SOLUTION INTRAMUSCULAR; INTRAVENOUS at 08:49

## 2021-04-28 RX ADMIN — SODIUM CHLORIDE 125 ML/HR: 9 INJECTION, SOLUTION INTRAVENOUS at 16:00

## 2021-04-28 RX ADMIN — FAMOTIDINE 20 MG: 20 TABLET, FILM COATED ORAL at 17:06

## 2021-04-28 RX ADMIN — FAMOTIDINE 20 MG: 20 TABLET, FILM COATED ORAL at 05:50

## 2021-04-28 RX ADMIN — ROCURONIUM BROMIDE 20 MG: 10 INJECTION INTRAVENOUS at 10:29

## 2021-04-28 RX ADMIN — GLYCOPYRROLATE 0.2 MG: 0.2 INJECTION INTRAMUSCULAR; INTRAVENOUS at 12:32

## 2021-04-28 RX ADMIN — DEXAMETHASONE SODIUM PHOSPHATE 10 MG: 4 INJECTION, SOLUTION INTRAMUSCULAR; INTRAVENOUS at 09:00

## 2021-04-28 RX ADMIN — CEFAZOLIN SODIUM 2 G: 1 POWDER, FOR SOLUTION INTRAMUSCULAR; INTRAVENOUS at 09:30

## 2021-04-28 RX ADMIN — CYCLOBENZAPRINE 10 MG: 10 TABLET, FILM COATED ORAL at 06:36

## 2021-04-28 NOTE — ANESTHESIA POSTPROCEDURE EVALUATION
Procedure(s):  C6-7 POSTERIOR HARDWARE REMOVAL/ C3-T2 POSTERIOR CERVICAL FUSION WITH INSTRUMENTATION/OSTEO AMP AND ILIAC CREST BONE MARROW ASPIRATE WITH IMAGE GUIDANCE (O-ARM). general, total IV anesthesia    Anesthesia Post Evaluation      Multimodal analgesia: multimodal analgesia not used between 6 hours prior to anesthesia start to PACU discharge  Patient location during evaluation: PACU  Patient participation: complete - patient participated  Level of consciousness: awake  Pain management: adequate  Airway patency: patent  Anesthetic complications: no  Cardiovascular status: acceptable, blood pressure returned to baseline and hemodynamically stable  Respiratory status: acceptable  Hydration status: acceptable  Post anesthesia nausea and vomiting:  controlled      INITIAL Post-op Vital signs:   Vitals Value Taken Time   /75 04/28/21 1440   Temp 36.4 °C (97.5 °F) 04/28/21 1402   Pulse 88 04/28/21 1445   Resp 21 04/28/21 1445   SpO2 98 % 04/28/21 1445   Vitals shown include unvalidated device data.

## 2021-04-28 NOTE — PROGRESS NOTES
ORTHOPAEDIC CERVICAL FUSION PROGRESS NOTE    NAME:     Kiko Powell   :       1970   MRN:       [id number]   DATE:      2021    POD:              1 Day Post-Op  S/P:              Procedure(s):  C4-7 ANTERIOR HARDWARE REMOVAL, C7 - T1 ANTERIOR CERVICAL DISCECTOMY AND  FUSION WITH INSTRUMENTATION, OSTEO AMP AND ILIAC CREST BONE MARROW ASPIRATE    SUBJECTIVE:  C/O sore throat, though not as bad as previous surgeries. Reports improvement in preop bilateral arm pain, with lingering discomfort R>L arm. Postop pain controlled  Tolerating PO intake  Ambulatory: OOB multiple times yesterday  Denies nausea/vomiting, headache, chest pain or shortness of breath  Recent Labs     21  0427   HGB 11.2*      K 4.1   *   CO2 23   BUN 10   CREA 0.61   GLU 93     Patient Vitals for the past 12 hrs:   BP Temp Pulse Resp SpO2 Height Weight   21 0720 -- 98.5 °F (36.9 °C) 71 17 98 % 5' 2\" (1.575 m) 72.3 kg (159 lb 6.3 oz)   21 0659 -- -- 71 -- -- -- --   21 0337 106/65 98.4 °F (36.9 °C) 60 16 94 % -- --   21 2354 (!) 92/55 98.5 °F (36.9 °C) 79 16 92 % -- --   21 104/66 98.1 °F (36.7 °C) 78 16 95 % -- --     Exam:  Positive strength/ROM bilat upper ext. With exception of left 4/5 deltoid, left 4/5 biceps  Neuro intact to sensation with exception of chronic left C6 distribution numbness, right lateral upper extremity C7 numbness.   Dressings clean and dry  VISTA collar inplace / intact  Hemovac: minimal drainage      PLAN: 1 Day Post-Op, improved   Continue PO pain medications as needed  Continue SCD's, frequent ambulation  NPO for surgery today  Continue cervical orthosis  Continue Vit D3  Stage two posterior fusion today    Rose Green NP

## 2021-04-28 NOTE — PERIOP NOTES
TRANSFER - OUT REPORT:    Verbal report given to Tra Rao on [place]  being transferred to  for routine post - op       Report consisted of patients Situation, Background, Assessment and   Recommendations(SBAR). Information from the following report(s) SBAR, OR Summary, Intake/Output, MAR and Cardiac Rhythm NSR was reviewed with the receiving nurse. Lines:   Peripheral IV 04/27/21 Right Hand (Active)   Site Assessment Clean, dry, & intact 04/28/21 1515   Phlebitis Assessment 0 04/28/21 1515   Infiltration Assessment 0 04/28/21 1515   Dressing Status Clean, dry, & intact 04/28/21 1515   Dressing Type Transparent 04/28/21 1515   Hub Color/Line Status Pink 04/28/21 1515   Action Taken Open ports on tubing capped 04/28/21 0450   Alcohol Cap Used Yes 04/28/21 1515       Peripheral IV 04/28/21 Left Hand (Active)   Site Assessment Clean, dry, & intact 04/28/21 1515   Phlebitis Assessment 0 04/28/21 1515   Infiltration Assessment 0 04/28/21 1515   Dressing Status Clean, dry, & intact 04/28/21 1515   Dressing Type Transparent 04/28/21 1515   Hub Color/Line Status Green 04/28/21 1515   Alcohol Cap Used Yes 04/28/21 1515        Opportunity for questions and clarification was provided.       Patient transported with:   O2 @ 4 liters  Registered Nurse

## 2021-04-28 NOTE — ANESTHESIA PREPROCEDURE EVALUATION
Relevant Problems   No relevant active problems       Anesthetic History     PONV          Review of Systems / Medical History  Patient summary reviewed, nursing notes reviewed and pertinent labs reviewed    Pulmonary  Within defined limits                 Neuro/Psych   Within defined limits           Cardiovascular              Hyperlipidemia         GI/Hepatic/Renal  Within defined limits              Endo/Other  Within defined limits           Other Findings   Comments: Chronic pain           Physical Exam    Airway  Mallampati: II  TM Distance: 4 - 6 cm  Neck ROM: normal range of motion   Mouth opening: Normal     Cardiovascular    Rhythm: regular  Rate: normal         Dental  No notable dental hx       Pulmonary  Breath sounds clear to auscultation               Abdominal         Other Findings            Anesthetic Plan    ASA: 2  Anesthesia type: general            Anesthetic plan and risks discussed with: Patient

## 2021-04-28 NOTE — PROGRESS NOTES
Shift Change:    Bedside and Verbal shift change report given to Margarito RN (oncoming nurse) by Patti Ratliff RN (offgoing nurse). Report included the following information SBAR, Kardex, Intake/Output, MAR and Recent Results. 1713: Patient c/o severe pain and requesting Dilaudid IV instead of IV Morphine. Stated \"tylenol works better than morphine. \" Notified MD

## 2021-04-28 NOTE — PROGRESS NOTES
Patient seen in recovery room, extubated uneventfully.   preop arm pain improved  Postop pain controlled    Patient Vitals for the past 4 hrs:   Temp Pulse Resp BP SpO2   04/28/21 1415 -- 84 10 110/70 97 %   04/28/21 1410 -- 92 18 112/68 96 %   04/28/21 1405 -- (!) 101 27 116/69 99 %   04/28/21 1402 97.5 °F (36.4 °C) 94 18 111/61 99 %   04/28/21 1400 98.4 °F (36.9 °C) 93 14 111/61 99 %       Following commands  Dressing clean and dry  hemovac in place and functioning with minimal output  MAIRA is functioning, though flashing light indicating that there may be a small leak  Strong motor RUE and LUE, RLE and LLE  Sensation grossly intact to LT with exception of baseline chronic C6 LLE numbness which remains    Stable postop posterior cervical fusion  -discussed surgery with family, answered questions  -periop antibiotics  -SCD's  -advance diet  -mobilize  -pain control  - Mccall to be removed once patient is ambulating   -plan d/c home Friday/Saturday pending pain tolerance

## 2021-04-28 NOTE — PROGRESS NOTES
Patient seen and eval'd this am.   Reports improvement in bilateral arm pain, some still lingers. Posterior neck pain, upper and lower. Swallowing well  Used PO pain meds and flexeril and toradol. On exam, patient seen in presence of   Motor improved to 4/5 LUE, normal RUE, normal LE's  Sensation diminished left C6 (unchanged)    Successful stage 1, C7-T1 ACDF with hardware removal.   Discussed continuing vs delay of stage 2. (nonunion C3-4, possible loss of reduction C7-T1, checking posterior hardware) and risks and benefits of both approaches. At this time she is going to proceed with stage 2. Consent signed. Posterior cervical marked.

## 2021-04-28 NOTE — BRIEF OP NOTE
Brief Postoperative Note    Patient: Neeru Archer  YOB: 1970  MRN: [id number]    Date of Procedure: 4/28/2021     Pre-Op Diagnosis: CERVICAL RADICULOPATHY/HX CERVICAL FUSION/SPONDYLOLISTHESIS/POST LAMINECTOMY SYNDROME/RADICULITIS, pseudoarthrosis C3-4    Post-Op Diagnosis: Same as preoperative diagnosis. Procedure(s):  C6-7 POSTERIOR HARDWARE REMOVAL/ C3-T2 POSTERIOR CERVICAL FUSION WITH INSTRUMENTATION/OSTEO AMP AND ILIAC CREST BONE MARROW ASPIRATE WITH IMAGE GUIDANCE (O-ARM)    Surgeon(s):  Ervin Virgen MD    Surgical Assistant: Nurse Practitioner: Юлия Goldberg NP    Anesthesia: General     Estimated Blood Loss (mL): 191     Complications: None    Specimens: * No specimens in log *     Implants:   Implant Name Type Inv. Item Serial No.  Lot No. LRB No. Used Action   Vibone Moldable Osteo Viable Matrix   [id number]  N/A N/A 1 Implanted   GRAFT BONE 10 CC - [id number]  GRAFT BONE 10 CC [id number]WD [id number] N/A 1 Implanted   SET SCR SPNL TI ALLY OCCIPITOCERVICOTHORACIC STREAMLINE OCT - SN/A  SET SCR SPNL TI ALLY OCCIPITOCERVICOTHORACIC STREAMLINE OCT N/A [employer]_WD N/A N/A 10 Implanted   Streamline [place] 3.5 x12 Screw [id number]   N/A  N/A N/A 6 Implanted   Horacio 100mm [id number]   N/A  N/A N/A 2 Implanted   4.0 x 26 Screw [id number]   N/A  N/A N/A 4 Implanted   Lateral Connector 26-OFF SETCONN-5   N/A  N/A N/A 1 Implanted   CAP SPNL OCCIPITOCERVICOTHORACIC HOANG THRD QUARTEX - SN/A  CAP SPNL OCCIPITOCERVICOTHORACIC HOANG THRD QUARTEX N/A [employer]WD N/A N/A 2 Implanted       Drains:   [REMOVED] Hemovac Left; Anterior Neck (Removed)   Site Assessment Clean, dry, & intact 04/28/21 0450   Dressing Status Clean, dry, & intact 04/28/21 0450   Drainage Description Serosanguinous 04/28/21 0450   Status Patent; Charged;Draining 04/28/21 0450   Output (ml) 0 ml 04/28/21 0606       Findings: pseudo C3-4, atrophy of posterior musculature, pseudo C6-7    Electronically Signed by Wanda Fair MD on 4/28/2021 at 1:23 PM

## 2021-04-28 NOTE — OP NOTES
Mauricio Montoya  [id number]        Date of surgery. 4/28/2021    Preoperative diagnosis: Cervical radiculopathy, pseudoarthrosis C3-4, cervical postlaminectomy syndrome, postlaminectomy kyphosis and spondylolisthesis C7-T1    Postop diagnosis: Same     Procedures:  C3-T2 posterior spinal fusion  C3-T2 segmental instrumentation utilizing the Surgalign posterior cervical system with 3.5 x 12 mm screws bilaterally at C3-C4 and C5, 4.0 x 26 mm screws bilaterally at T1 and T2  Hardware removal C6-7, globus  Use of stereotactic navigation [employer] O arm for spinal instrumentation  Placement of cranial tongs  Use of morselized allograft and local autograft bone for posterior cervical spinal fusion    Surgeon:Martin oM MD    Assist: Latricia Espinosa NP was present and scrubbed for procedure today as my surgical assistant and provided assistance with exposure, retraction and wound closure. Anesthesia: General  EBL: 300  Specimens: none  Implants: Surgalign posterior cervical instrumentation, globus removal  Complications: none    Indications: Pleasant 61-year-old female returns today to the operating room for stage II of a planned anterior and posterior cervical reconstruction. After the anterior portion she reported improvement in her preoperative bilateral C8 pain, she had the residual C6 weakness and numbness, motor strength had improved in the left upper extremity. We once again reviewed the risks and benefits which she understood and wished to proceed with surgery. Operative note: Posterior cervical spine was marked as site of surgery. She is noted to have atrophy of the posterior musculature and splaying of the posterior cervical musculature. SCDs were maintained as well as Mccall catheter is maintained. Once again reviewed risks and benefits and answer questions. She was maintained in her [place] collar. She is taken back to the operating room.   Once in the operating room she underwent a general anesthetic by anesthesia team, Mccall and SCDs were maintained. We then proceeded to place [place] head barrett with cranial tongs. Care was taken to not have any pressure on the nose ears or eyes. We then proceeded with SSEP free running EMG and motor evoked neurological monitoring electrode placement. Baselines were obtained. Baseline showed improvement from baselines yesterday and even a little bit better in SSEPs at the beginning of today versus the end of yesterday. Patient was then gently logrolled onto the [facility] frame. Surgical management unit was attached. We then gently swaddled the arms behind. Care was taken to not have any pressure on the eyes ears nose. Padding was placed over the cubital carpal tunnels. We then gently taped the shoulders. Fluoroscopy was brought in and demonstrated excellent position in both AP and lateral planes. We also confirmed her previous incision spanning from C3-C7. We utilized reverse Trendelenburg position to decrease swelling. Patient was sterilely prepped and draped in usual standard fashion including alcohol followed by Betadine scrub paint and ChloraPrep. I then proceeded with a surgical timeout which was confirmed by the anesthesia staff our staff and myself. Patient received Ancef preoperatively. We then utilized some local anesthetic, Exparel into the paraspinal soft tissues. I then proceeded with a standard midline approach, I utilized the patient's previous midline posterior cervical incision extending this about 3 cm caudally and 1 cm proximally. Careful sharp dissection was taken down through the midline down to the level of the ligamentum nuchae. There was obvious splaying in the fascia was not intact. There was some residual suture here. The suture was removed. I then proceeded with exposure from C3 down to T2 utilizing monopolar cautery and Feliciano elevator. I exposed the previous hardware at C6-7.   I then proceeded with exposure of the transverse processes of T1 and T2. I then proceeded to explore the posterior fusion at C3-4. Kocher clamps at C3-4 demonstrated ongoing motion consistent with pseudoarthrosis. I then proceeded with removal of the scar tissue around the C6-7 hardware. The end caps and rods were then removed. The C7 screws were loose. I then proceeded to decorticate the facet joints at C3-4, C4-5, C5-6, C6-7 on the right as this had been essentially removed on the left C7-T1 bilaterally and T1-T2 bilaterally. I then proceeded to bring in fluoroscopy and utilized fluoroscopy to make  holes for lateral mass screws. Began at C3 make  holes at C3-C4-C5 on the right-hand side and then the left-hand side. All screw holes were palpated and were unicortical.  I then proceeded with placement of 12 mm lateral mass screws bilaterally at C3-C4 and C5. All had good bite. At this point I then proceeded to place the Medtronic spinous process clamp on to T2. I then proceeded to obtain my first O-arm spin. After confirming excellent images, I also confirmed that the C3-C4 and C5 new lateral mass screws were unicortical in good position, the C7 screws did appear to have some periimplant loosening. At this point I then proceeded to utilize the navigated bur and the navigated tap to make  holes for pedicle screws at T1 and T2. I then utilized the navigated instruments to place screws which were 4.0 x 26 mm bilaterally at T1 and T2. I then proceeded with stimulating EMG. All screws tested safely, I then proceeded with post instrumentation O-arm spin confirming safe trajectories for T1 and T2 screws as well. At this point I then proceeded to cut and contoured to lordotic rods. I then proceeded to decorticate the posterior lamina that remained at C3-C4-C5 T1 and T2. I had previously decorticated the lateral masses and facet joints at C3-4, C4-5, C5-6, C6-7 and C7-T1.   I then densely packed morselized allograft which was via bone, osteoamp fibers, and local autograft bone directly to the decorticated surfaces. Bone graft was mixed with vancomycin powder. I then proceeded to place the 2 rods and proceeded to torque and counter torque then within  specifications. At this point I then placed a Hemovac drain deep to the fascia. Final AP and lateral fluoroscopic radiographs demonstrated safe position of all hardware good lordosis and good contour at the cervicothoracic junction. Neurological monitor remained improved. Then proceeded with multilayer closure. The paraspinous musculature was closed in multiple layers to try to help improve some of the atrophy and splay. This was closed with #1 Vicryl suture in multiple layers and I additionally utilized #1 strata fix to close the ligamentum nuchae. Subcutaneous tissue was closed with 2-0 Vicryl and 0 Vicryl suture. Subcuticular closure with 3-0 strata fix followed by Dermabond and sheridan wound VAC. Drapes were then taken down, patient was then detached from the surgical management unit, patient was then gently logrolled back to the hospital bed, [place] collar was applied. Cranial tongs were then removed there is no evidence of bleeding. Patient tolerated procedure well was taken recovery room in stable condition. Maria C Chavez NP was present and scrubbed for procedure today as my surgical assistant and provided assistance with exposure, retraction and wound closure. Nathanael Darby MD performed the above procedures.      Op note dictated by Joyce Quinteros MD

## 2021-04-29 LAB
ANION GAP SERPL CALC-SCNC: 3 MMOL/L (ref 5–15)
BUN SERPL-MCNC: 8 MG/DL (ref 6–20)
BUN/CREAT SERPL: 16 (ref 12–20)
CALCIUM SERPL-MCNC: 7.8 MG/DL (ref 8.5–10.1)
CHLORIDE SERPL-SCNC: 109 MMOL/L (ref 97–108)
CO2 SERPL-SCNC: 27 MMOL/L (ref 21–32)
CREAT SERPL-MCNC: 0.5 MG/DL (ref 0.55–1.02)
GLUCOSE SERPL-MCNC: 126 MG/DL (ref 65–100)
HGB BLD-MCNC: 9.6 G/DL (ref 11.5–16)
POTASSIUM SERPL-SCNC: 4.2 MMOL/L (ref 3.5–5.1)
SODIUM SERPL-SCNC: 139 MMOL/L (ref 136–145)

## 2021-04-29 PROCEDURE — 65270000029 HC RM PRIVATE

## 2021-04-29 PROCEDURE — 36415 COLL VENOUS BLD VENIPUNCTURE: CPT

## 2021-04-29 PROCEDURE — 74011000250 HC RX REV CODE- 250: Performed by: NURSE PRACTITIONER

## 2021-04-29 PROCEDURE — 74011250637 HC RX REV CODE- 250/637: Performed by: NURSE PRACTITIONER

## 2021-04-29 PROCEDURE — 80048 BASIC METABOLIC PNL TOTAL CA: CPT

## 2021-04-29 PROCEDURE — 94760 N-INVAS EAR/PLS OXIMETRY 1: CPT

## 2021-04-29 PROCEDURE — 74011250636 HC RX REV CODE- 250/636: Performed by: NURSE PRACTITIONER

## 2021-04-29 PROCEDURE — 85018 HEMOGLOBIN: CPT

## 2021-04-29 PROCEDURE — 74011250636 HC RX REV CODE- 250/636: Performed by: PHYSICIAN ASSISTANT

## 2021-04-29 RX ORDER — HYDROMORPHONE HYDROCHLORIDE 1 MG/ML
0.5 INJECTION, SOLUTION INTRAMUSCULAR; INTRAVENOUS; SUBCUTANEOUS
Status: DISCONTINUED | OUTPATIENT
Start: 2021-04-29 | End: 2021-04-30 | Stop reason: HOSPADM

## 2021-04-29 RX ORDER — CYCLOBENZAPRINE HCL 10 MG
10 TABLET ORAL 3 TIMES DAILY
Status: DISCONTINUED | OUTPATIENT
Start: 2021-04-29 | End: 2021-04-30 | Stop reason: HOSPADM

## 2021-04-29 RX ORDER — OXYCODONE HYDROCHLORIDE 5 MG/1
15 TABLET ORAL
Status: DISCONTINUED | OUTPATIENT
Start: 2021-04-29 | End: 2021-04-30 | Stop reason: HOSPADM

## 2021-04-29 RX ADMIN — CYCLOBENZAPRINE 10 MG: 10 TABLET, FILM COATED ORAL at 21:01

## 2021-04-29 RX ADMIN — ACETAMINOPHEN 1000 MG: 500 TABLET, FILM COATED ORAL at 00:22

## 2021-04-29 RX ADMIN — HYDROMORPHONE HYDROCHLORIDE 1 MG: 1 INJECTION, SOLUTION INTRAMUSCULAR; INTRAVENOUS; SUBCUTANEOUS at 14:26

## 2021-04-29 RX ADMIN — ATORVASTATIN CALCIUM 40 MG: 20 TABLET, FILM COATED ORAL at 21:00

## 2021-04-29 RX ADMIN — CYCLOBENZAPRINE 10 MG: 10 TABLET, FILM COATED ORAL at 16:20

## 2021-04-29 RX ADMIN — OXYCODONE HYDROCHLORIDE 10 MG: 5 TABLET ORAL at 12:24

## 2021-04-29 RX ADMIN — Medication 10 ML: at 16:21

## 2021-04-29 RX ADMIN — SODIUM CHLORIDE 125 ML/HR: 9 INJECTION, SOLUTION INTRAVENOUS at 08:36

## 2021-04-29 RX ADMIN — Medication 10 ML: at 05:53

## 2021-04-29 RX ADMIN — FAMOTIDINE 20 MG: 20 TABLET, FILM COATED ORAL at 08:07

## 2021-04-29 RX ADMIN — OXYCODONE HYDROCHLORIDE 15 MG: 5 TABLET ORAL at 20:03

## 2021-04-29 RX ADMIN — OXYCODONE HYDROCHLORIDE 15 MG: 5 TABLET ORAL at 23:25

## 2021-04-29 RX ADMIN — KETOROLAC TROMETHAMINE 30 MG: 30 INJECTION, SOLUTION INTRAMUSCULAR at 00:22

## 2021-04-29 RX ADMIN — HYDROMORPHONE HYDROCHLORIDE 0.5 MG: 1 INJECTION, SOLUTION INTRAMUSCULAR; INTRAVENOUS; SUBCUTANEOUS at 18:26

## 2021-04-29 RX ADMIN — DOCUSATE SODIUM 50MG AND SENNOSIDES 8.6MG 1 TABLET: 8.6; 5 TABLET, FILM COATED ORAL at 18:27

## 2021-04-29 RX ADMIN — KETOROLAC TROMETHAMINE 30 MG: 30 INJECTION, SOLUTION INTRAMUSCULAR at 12:24

## 2021-04-29 RX ADMIN — OXYCODONE HYDROCHLORIDE 10 MG: 5 TABLET ORAL at 03:47

## 2021-04-29 RX ADMIN — ACETAMINOPHEN 1000 MG: 500 TABLET, FILM COATED ORAL at 05:53

## 2021-04-29 RX ADMIN — Medication 10 ML: at 05:54

## 2021-04-29 RX ADMIN — SODIUM CHLORIDE 125 ML/HR: 9 INJECTION, SOLUTION INTRAVENOUS at 00:25

## 2021-04-29 RX ADMIN — OXYCODONE HYDROCHLORIDE 10 MG: 5 TABLET ORAL at 16:20

## 2021-04-29 RX ADMIN — DEXAMETHASONE SODIUM PHOSPHATE 4 MG: 10 INJECTION, SOLUTION INTRAMUSCULAR; INTRAVENOUS at 05:52

## 2021-04-29 RX ADMIN — CEFAZOLIN 2 G: 1 INJECTION, POWDER, FOR SOLUTION INTRAMUSCULAR; INTRAVENOUS at 02:36

## 2021-04-29 RX ADMIN — HYDROMORPHONE HYDROCHLORIDE 1 MG: 1 INJECTION, SOLUTION INTRAMUSCULAR; INTRAVENOUS; SUBCUTANEOUS at 10:09

## 2021-04-29 RX ADMIN — DOCUSATE SODIUM 50MG AND SENNOSIDES 8.6MG 1 TABLET: 8.6; 5 TABLET, FILM COATED ORAL at 08:07

## 2021-04-29 RX ADMIN — Medication 10 ML: at 21:01

## 2021-04-29 RX ADMIN — KETOROLAC TROMETHAMINE 30 MG: 30 INJECTION, SOLUTION INTRAMUSCULAR at 05:52

## 2021-04-29 RX ADMIN — THERA TABS 1 TABLET: TAB at 08:07

## 2021-04-29 RX ADMIN — CYCLOBENZAPRINE 10 MG: 10 TABLET, FILM COATED ORAL at 03:50

## 2021-04-29 RX ADMIN — HYDROMORPHONE HYDROCHLORIDE 1 MG: 1 INJECTION, SOLUTION INTRAMUSCULAR; INTRAVENOUS; SUBCUTANEOUS at 00:22

## 2021-04-29 RX ADMIN — CEFAZOLIN 2 G: 1 INJECTION, POWDER, FOR SOLUTION INTRAMUSCULAR; INTRAVENOUS at 10:09

## 2021-04-29 RX ADMIN — OXYCODONE HYDROCHLORIDE 10 MG: 5 TABLET ORAL at 08:12

## 2021-04-29 RX ADMIN — HYDROMORPHONE HYDROCHLORIDE 1 MG: 1 INJECTION, SOLUTION INTRAMUSCULAR; INTRAVENOUS; SUBCUTANEOUS at 05:52

## 2021-04-29 NOTE — PROGRESS NOTES
Pt call light within reach, bed locked and in lowest position, side rail up x2, and fall precautions in place.

## 2021-04-29 NOTE — PROGRESS NOTES
ORTHOPAEDIC CERVICAL FUSION PROGRESS NOTE    NAME:     Elenita Kraus   :       1970   MRN:       [id number]   DATE:      2021    POD:              1 Day Post-Op  S/P:              Procedure(s):  C6-7 POSTERIOR HARDWARE REMOVAL/ C3-T2 POSTERIOR CERVICAL FUSION WITH INSTRUMENTATION/OSTEO AMP AND ILIAC CREST BONE MARROW ASPIRATE WITH IMAGE GUIDANCE (O-ARM)    SUBJECTIVE:  C/O sore throat   Reports improvement in preop arm pain  Postop pain controlled  Tolerating PO intake  Ambulatory -- 20 feet yesterday, no trouble voiding on toilet  Denies nausea/vomiting, headache, chest pain or shortness of breath  Recent Labs     21  0042   HGB 9.6*      K 4.2   *   CO2 27   BUN 8   CREA 0.50*   *     Patient Vitals for the past 12 hrs:   BP Temp Pulse Resp SpO2   21 0520 119/72 98.7 °F (37.1 °C) 78 16 93 %   21 0025 103/67 98.6 °F (37 °C) 79 17 92 %   21 2110 102/74 99.3 °F (37.4 °C) 91 17 94 %     Exam:  Positive strength/ROM bilat upper ext. Neuro intact to sensation grossly except: chronic left L5 distribution decrease sensation. Dressings clean and dry, intact. MAIRA functioning but likely with small leak. VISTA collar inplace / intact  Hemovac: holding suction, 40 ml out past shift      PLAN: 1 Day Post-Op, improved   Continue PO/IV pain medications as needed  Continue SCD's, frequent ambulation  Advance to regular diet  Continue cervical orthosis  Continue Vit D3  D/C to home Tomorrow vs Saturday    Abimael Castrejon NP     I have seen and examined patient today and agree with above. Doing well with swallowing and diet. Reports she hasn't had any more arm pain or discomfort since posterior surgery. Her numbness is also improved. Motor strength greatly improved this morning. Sensation just slightly diminished in left C6, improved from yesterday despite this being chronic. Plan as above.      Angélica De Santiago MD  Spine Surgery  [facility]

## 2021-04-29 NOTE — PROGRESS NOTES
Problem: Falls - Risk of  Goal: *Absence of Falls  Description: Document Michael Nestor Fall Risk and appropriate interventions in the flowsheet.   Outcome: Progressing Towards Goal  Note: Fall Risk Interventions:  Mobility Interventions: Patient to call before getting OOB         Medication Interventions: Teach patient to arise slowly

## 2021-04-29 NOTE — PROGRESS NOTES
4/29/2021 11:22 AM Case management consult for home health received. Pt observes walking independently in the hallway. Spoke with Ortho NP, home health orders can be discontinued. CM will follow.  BABATUNDE Strauss

## 2021-04-30 VITALS
DIASTOLIC BLOOD PRESSURE: 71 MMHG | OXYGEN SATURATION: 100 % | HEART RATE: 80 BPM | RESPIRATION RATE: 16 BRPM | SYSTOLIC BLOOD PRESSURE: 112 MMHG | HEIGHT: 65 IN | WEIGHT: 162.92 LBS | TEMPERATURE: 98.2 F | BODY MASS INDEX: 27.14 KG/M2

## 2021-04-30 LAB
COMMENT, HOLDF: NORMAL
HGB BLD-MCNC: 8.4 G/DL (ref 11.5–16)
SAMPLES BEING HELD,HOLD: NORMAL

## 2021-04-30 PROCEDURE — 85018 HEMOGLOBIN: CPT

## 2021-04-30 PROCEDURE — 36415 COLL VENOUS BLD VENIPUNCTURE: CPT

## 2021-04-30 PROCEDURE — 74011250636 HC RX REV CODE- 250/636: Performed by: NURSE PRACTITIONER

## 2021-04-30 PROCEDURE — 74011250637 HC RX REV CODE- 250/637: Performed by: NURSE PRACTITIONER

## 2021-04-30 RX ORDER — CEPHALEXIN 500 MG/1
500 CAPSULE ORAL 3 TIMES DAILY
Qty: 30 CAP | Refills: 0 | Status: SHIPPED | OUTPATIENT
Start: 2021-04-30 | End: 2021-05-10

## 2021-04-30 RX ORDER — CYCLOBENZAPRINE HCL 10 MG
10 TABLET ORAL 3 TIMES DAILY
Qty: 60 TAB | Refills: 1 | Status: SHIPPED | OUTPATIENT
Start: 2021-04-30

## 2021-04-30 RX ORDER — CHOLECALCIFEROL TAB 125 MCG (5000 UNIT) 125 MCG
5000 TAB ORAL DAILY
Qty: 120 TAB | Refills: 1 | Status: SHIPPED | OUTPATIENT
Start: 2021-04-30 | End: 2022-08-03

## 2021-04-30 RX ORDER — OXYCODONE HYDROCHLORIDE 5 MG/1
5-15 TABLET ORAL
Qty: 84 TAB | Refills: 0 | Status: SHIPPED | OUTPATIENT
Start: 2021-04-30 | End: 2021-05-07

## 2021-04-30 RX ORDER — ONDANSETRON 4 MG/1
4 TABLET, ORALLY DISINTEGRATING ORAL
Qty: 10 TAB | Refills: 0 | Status: SHIPPED | OUTPATIENT
Start: 2021-04-30 | End: 2022-08-03

## 2021-04-30 RX ORDER — NALOXONE HYDROCHLORIDE 0.4 MG/ML
0.4 INJECTION, SOLUTION INTRAMUSCULAR; INTRAVENOUS; SUBCUTANEOUS AS NEEDED
Qty: 1 ML | Refills: 0 | Status: SHIPPED | OUTPATIENT
Start: 2021-04-30 | End: 2022-08-03

## 2021-04-30 RX ORDER — KETOROLAC TROMETHAMINE 30 MG/ML
30 INJECTION, SOLUTION INTRAMUSCULAR; INTRAVENOUS EVERY 6 HOURS
Status: COMPLETED | OUTPATIENT
Start: 2021-04-30 | End: 2021-04-30

## 2021-04-30 RX ADMIN — Medication 10 ML: at 06:50

## 2021-04-30 RX ADMIN — THERA TABS 1 TABLET: TAB at 08:44

## 2021-04-30 RX ADMIN — OXYCODONE HYDROCHLORIDE 15 MG: 5 TABLET ORAL at 10:00

## 2021-04-30 RX ADMIN — KETOROLAC TROMETHAMINE 30 MG: 30 INJECTION, SOLUTION INTRAMUSCULAR; INTRAVENOUS at 14:45

## 2021-04-30 RX ADMIN — CYCLOBENZAPRINE 10 MG: 10 TABLET, FILM COATED ORAL at 08:44

## 2021-04-30 RX ADMIN — OXYCODONE HYDROCHLORIDE 15 MG: 5 TABLET ORAL at 04:06

## 2021-04-30 RX ADMIN — ACETAMINOPHEN 1000 MG: 500 TABLET, FILM COATED ORAL at 06:49

## 2021-04-30 RX ADMIN — KETOROLAC TROMETHAMINE 30 MG: 30 INJECTION, SOLUTION INTRAMUSCULAR; INTRAVENOUS at 08:44

## 2021-04-30 RX ADMIN — OXYCODONE HYDROCHLORIDE 15 MG: 5 TABLET ORAL at 06:49

## 2021-04-30 RX ADMIN — ACETAMINOPHEN 1000 MG: 500 TABLET, FILM COATED ORAL at 12:20

## 2021-04-30 RX ADMIN — OXYCODONE HYDROCHLORIDE 15 MG: 5 TABLET ORAL at 13:21

## 2021-04-30 RX ADMIN — DIAZEPAM 5 MG: 5 TABLET ORAL at 03:09

## 2021-04-30 RX ADMIN — DOCUSATE SODIUM 50MG AND SENNOSIDES 8.6MG 1 TABLET: 8.6; 5 TABLET, FILM COATED ORAL at 08:44

## 2021-04-30 NOTE — DISCHARGE INSTRUCTIONS
[facility], [address]  Office Phone: [phone]  Neck Surgery Discharge Instructions  Activities   You are going home a well person, be as active as possible. Your only exercise should be walking. Start with short frequent walks and increase your walking distance each day. Start with walking twice a day for 5 minutes and increase your distance each day 2-3 minutes until you reach 25 minutes twice a day. Limit the amount of time you sit to 20-30 minute intervals. Sitting for prolonged periods of time will be uncomfortable for you following your surgery.  Do not lift anything over 20 pounds for 3 months, and do not do any bending or straining.  Avoid reaching overhead in this post-operative period   Do not do any neck exercises until you have been instructed by your doctor.  When you are in the bed, you may lay on your back or on either side. Do not lie on your stomach.  Continue using your incentive spirometer regularly for deep breathing exercises   You may resume sexual relations 2 weeks after your surgery    Diet   You may resume your normal diet. If your throat is sore, you may want to eat soft foods for a few days. Generally, foods should be soft and moist, avoid very dry or brittle foods (crackers, fried foods). Be sure to drink plenty of fluids, it is important to keep yourself hydrated. If you begin having trouble swallowing, call the office immediately.  Avoid alcoholic beverages and ABSOLUTELY NO tobacco products. Tobacco products will interfere with your healing. If you continue to use tobacco, you may end up needing another surgery in the future.  To support your fusion, nutrition is important. You should consume approximately 60-80 grams of lean protein daily, 5,000-10,000 mg Vit D3, [id number] mg Calcium per day. Medications   Do not take anti-inflammatory medications or aspirin unless instructed by your physician.    Take your pain medication as directed.  Do NOT take additional Tylenol if your prescribed pain medication has acetaminophen in it (Endocet/Percocet, Lortab, Norco). You may sub   It is important to have regular bowel movements. Pain medications may cause constipation. Stool softeners, prune juice, and increasing your water and fiber intake may help in preventing constipation.  Do NOT take laxatives if at all possible except in severe situations. It can results in a vicious cycle of constipation and diarrhea.  Do not be alarmed if you still have some of the same symptoms you had prior to surgery. The nerves often require time to heal after the pressure has been relieved. You may experience pain in your shoulders or between your shoulder blades, which is common after this surgery. The level of pain you experience should improve as your body heals. Driving   You may not drive or return to work until instructed by your physician. However, you may ride in the car for short periods of time. Neck collar   Wear your neck brace. You may remove it for short breaks, when eating or showering. You must keep the brace on while sleeping and when ambulating. Showering   You may shower from shoulders down once you go home, wait for approximately 5 days after your surgery to shower head to toe and only if your incision is not draining.  You may remove your brace during showers.  Do not rub or apply lotion or ointments to the incision site.  Do not use tub baths, swimming pools or Jacuzzis. Caring for your incision   Keep the dressing on until 7 days after surgery. At that point, if the incision is dry and without drainage, you may keep the wound open to air without cover.  You may have steri-strips on your incision (small, white pieces of tape). Do not pull the steri-strips; they will fall off on their own after several days.   If you have sutures or staples, they will be removed by home health or when you see your physician.  Do not rub or apply any lotions or ointments to your incision site.  Maintain MAIRA functioning if possible. Reinforce as necessary. Follow Up   Once you are home, call your physicians office to schedule an appointment 2-3 weeks after surgery. Notify your physician if you develop any of the following conditions:   Fever above 101 degrees for 24 hours.  Nausea or vomiting.  Severe headache.  Inability to urinate.  Loss of bowel or bladder control (sudden onset of incontinence).  Changes in sensation in your extremities (numbness, tingling, loss of color).  Severe pain or pain not relieved by medications.  Redness, swelling, or drainage from your incision.  Persistent pain in the chest.    Pain in the calf of either leg.  Increased weakness (if this is greater than before your surgery).

## 2021-04-30 NOTE — PROGRESS NOTES
ORTHOPAEDIC CERVICAL FUSION PROGRESS NOTE    NAME:     Tee Code   :       1970   MRN:       [id number]   DATE:      2021    POD:              2 Days Post-Op  S/P:              Procedure(s):  C6-7 POSTERIOR HARDWARE REMOVAL/ C3-T2 POSTERIOR CERVICAL FUSION WITH INSTRUMENTATION/OSTEO AMP AND ILIAC CREST BONE MARROW ASPIRATE WITH IMAGE GUIDANCE (O-ARM)    SUBJECTIVE:  C/O sore throat   Reports improvement in preop arm pain, numbness. Postop pain controlled  Tolerating PO intake  Ambulatory  Denies nausea/vomiting, headache, chest pain or shortness of breath    Recent Labs     21  0340 21  0042   HGB 8.4* 9.6*   NA  --  139   K  --  4.2   CL  --  109*   CO2  --  27   BUN  --  8   CREA  --  0.50*   GLU  --  126*     Patient Vitals for the past 12 hrs:   BP Temp Pulse Resp SpO2   21 0444 (!) 102/57 98.2 °F (36.8 °C) 81 17 92 %   21 0006 100/65 98.5 °F (36.9 °C) 78 16 93 %   21 2040 (!) 104/51 98.7 °F (37.1 °C) 82 16 92 %     Exam:  Positive strength/ROM bilat upper ext. Neuro intact to sensation: decreased left C6 distal sensation. Dressings clean and dry, intact. VISTA collar inplace / intact  Hemovac: removed     PLAN: 2 Days Post-Op, improved   Continue PO pain medications as needed  Continue SCD's, frequent ambulation  Regular diet  Continue cervical orthosis  Will change MAIRA today prior to discharge.   Continue Vit D3  D/C to home today if pain tolerance improves    Saeid Foster NP

## 2021-04-30 NOTE — PROGRESS NOTES
4/30/2021 1:38 PM Discharge order noted. No discharge needs identified. Pt to discharge home with family and  will transport her home.  BABATUNDE Mckinley   Care Management Interventions  PCP Verified by CM: Tiffany Ratliff )  Transition of Care Consult (CM Consult): Discharge Planning  MyChart Signup: No  Discharge Durable Medical Equipment: No  Physical Therapy Consult: No  Occupational Therapy Consult: No  Speech Therapy Consult: No  Current Support Network: Lives with Spouse  Discharge Location  Discharge Placement: Home with family assistance

## 2021-04-30 NOTE — PROGRESS NOTES
Pt ambulated in the hallway and in her room multiple times throughout the night. Pt has orders to be up ad sunita. Pt expressed concern and frustration for feeling like she has regressed some d/t being able to walk much farther previous days. The patient was listened to and encouraged to not give up. Pt was left with call bell in reach and resting comfortably.

## 2021-04-30 NOTE — DISCHARGE SUMMARY
Orthopedic Service Discharge Summary    Patient ID:  Latonya Stock  [id number]  female  48 y.o.  1970    Admit date: 4/27/2021    Discharge date and time: 4/30/21    Admitting Physician: Sally Scherer MD     Discharge Physician: Sally Scherer MD    Consulting Physician(s): Treatment Team: Attending Provider: Charley Mo MD; Care Manager: Paige Davila; Utilization Review: Pamela Medina RN; Utilization Review: Pamela Siegel RN    Date of Surgery: 4/28/2021     Preoperative Diagnosis:  CERVICAL RADICULOPATHY/HX CERVICAL FUSION/SPONDYLOLISTHESIS/POST LAMINECTOMY SYNDROME/RADICULITIS    Postoperative Diagnosis: CERVICAL RADICULOPATHY/HX CERVICAL FUSION/SPONDYLOLISTHESIS/POST LAMINECTOMY SYNDROME/RADICULITIS    Procedure(s): Procedure(s):    Stage 1: C7-T1 ACDF with hardware removal.     Stage 2: C6-7 POSTERIOR HARDWARE REMOVAL/ C3-T2 POSTERIOR CERVICAL FUSION WITH INSTRUMENTATION/OSTEO AMP AND ILIAC CREST BONE MARROW ASPIRATE WITH IMAGE GUIDANCE (O-ARM)    Surgeon: Surgeon(s) and Role:     * Charley Mo MD - Primary      Anesthesia:  General    Preoperative Medical Clearance:                           HPI:  Pt is a 48 y.o. female who has a history of Acquired spondylolisthesis of cervical vertebra [M43.12]  with pain and limitations of activities of daily living who presents at this time for bilateral cervical radiculitis (chronic numbness left C6, pain in C8 distribution with left being greater than right) following the failure of conservative management. PMH:   Past Medical History:   Diagnosis Date    Chronic neck pain     COVID-19 vaccine series completed 04/2021    Moderna    High cholesterol     PONV (postoperative nausea and vomiting)        Medications upon admission :   Prior to Admission Medications   Prescriptions Last Dose Informant Patient Reported?  Taking?   acetaminophen (Tylenol Extra Strength) 500 mg tablet 4/26/2021 at Unknown time  Yes Yes   Sig: Take 1,000 mg by mouth as needed for Pain. ibuprofen (AdviL) 200 mg tablet 4/26/2021 at Unknown time  Yes Yes   Sig: Take 400 mg by mouth as needed for Pain.   multivitamin (ONE A DAY) tablet 4/15/2021  Yes No   Sig: Take 1 Tab by mouth daily. simvastatin (ZOCOR) 40 mg tablet 4/18/2021  Yes No   Sig: Take 40 mg by mouth nightly. Facility-Administered Medications: None        Allergies: Allergies   Allergen Reactions    Erythromycin Other (comments)     \"stomach pain\" vomiting    Penicillins Rash and Swelling     Pt is ok with taking Cephalosporins        Hospital Course: The patient underwent surgery. Complications: None; patient tolerated the procedure well. She had ACDF C7-T1 on 4/27 and then posterior cervical fusion C3-T2 on 4/28. Both times she was taken to the PACU in stable condition and then transferred to the Orthopedics floor without complication. She had a successful stay on the Orthopedic unit after surgery. POD #1 (C7-T1) ACDF with hardware removal (4/28/21): No events overnight. Pain management was a bit challenging, it appears as though Morphine IV was changed to Hydromorphone IV. She ambulated the afternoon after surgery with assistance of staff. Her pre-op arm pain began resolving. She was able to tolerate fluids and speech was largely unaffected. Hemovac removed and anterior neck dressing changed. She was moved to pre-op and then went forward with her second stage of surgery (posterior C3-T2 fusion). The evening after 2nd surgery, she was able to ambulate and cisse catheter was removed. POD #1 (C3-T2) Posterior Fusion (4/29/21): No events overnight. Pain management went better than previous night. She was able to ambulate once again after surgery with the help of staff. She has been able to utilize PO and IV pain medications to keep discomforts at a manageable level.  In order to decrease reliance on IV Hydromorphone, I increased Oxycodone from 5/10mg to 5/15mg and decreased Hydromorphone from 1.0 to 0.5. Further improvements with upper extremity strength and decreases in upper extremity pains. Her pain is primarily posterior neck now. Hemovac drain removed. POD # 2 (C3-T2) Posterior Fusion (4/30/21): In the morning she was visible upset due to increase in pain. She attempted to back off the IV pain medication in an attempt to gear herself up for discharge. I think she regressed a little bit in terms of pain management. Her pain is in the posterior neck surrounding the incision and involving surrounding musculature. Toradol and Flexeril prescribed for added pain relief. No difficulties with her speech or swallowing. Patient promoted discharge today despite ongoing discomforts. There was some trouble with functionality of MAIRA dressing and we changes the dressing just prior to discharge. It was fully functional after the 2nd application of the dressing. Perioperative Antibiotics:  Ancef     Postoperative Pain Management:  Acetaminophen, Dilaudid and Oxycodone, Antibiotics, Flexeril    DVT Prophylaxis: SCD's    Postoperative transfusions:     none    Post Op complications: none    Hemoglobin at discharge:    Lab Results   Component Value Date/Time    HGB 8.4 (L) 04/30/2021 03:40 AM    INR 1.1 04/15/2021 12:11 PM     Discharged to: Home. Discharge instructions:  -See Full Summary of discharge instructions attached  -Anticoagulate with frequent ambulation.  -Resume pre hospital diet            -Resume home medications   Current Discharge Medication List      START taking these medications    Details   oxyCODONE IR (ROXICODONE) 5 mg immediate release tablet Take 1-3 Tabs by mouth every six (6) hours as needed for Pain for up to 7 days. Max Daily Amount: 60 mg.  Qty: 84 Tab, Refills: 0    Associated Diagnoses: Acquired spondylolisthesis of cervical vertebra      ondansetron (ZOFRAN ODT) 4 mg disintegrating tablet Take 1 Tab by mouth every eight (8) hours as needed for Nausea or Vomiting.   Qty: 10 Tab, Refills: 0      cyclobenzaprine (FLEXERIL) 10 mg tablet Take 1 Tab by mouth three (3) times daily. Qty: 60 Tab, Refills: 1      cephALEXin (KEFLEX) 500 mg capsule Take 1 Cap by mouth three (3) times daily for 10 days. Qty: 30 Cap, Refills: 0      naloxone (NARCAN) 0.4 mg/mL injection 1 mL by IntraVENous route as needed for Other (Give if breaths per minute < 10, may repeat dose in 15 min and contact MD). Qty: 1 mL, Refills: 0      cholecalciferol (Vitamin D3) (5000 Units/125 mcg) tab tablet Take 1 Tab by mouth daily. Qty: 120 Tab, Refills: 1         CONTINUE these medications which have NOT CHANGED    Details   acetaminophen (Tylenol Extra Strength) 500 mg tablet Take 1,000 mg by mouth as needed for Pain.      simvastatin (ZOCOR) 40 mg tablet Take 40 mg by mouth nightly. multivitamin (ONE A DAY) tablet Take 1 Tab by mouth daily.          STOP taking these medications       ibuprofen (AdviL) 200 mg tablet Comments:   Reason for Stopping:            per medical continuation form  -Discharge activity: Activity as tolerated, No driving while on analgesics and See surgical instructions  -Ambulate with None  -Wound Care Keep wound clean and dry, Reinforce dressing PRN, Ice to area for comfort and As directed  -Follow up in office in the next few weeks    Signed:  Alyssa Linda NP  4/30/2021  1:08 PM        Thao De La Torre MD

## 2022-01-20 ENCOUNTER — OFFICE VISIT (OUTPATIENT)
Dept: HEMATOLOGY | Age: 52
End: 2022-01-20
Payer: COMMERCIAL

## 2022-01-20 VITALS
HEART RATE: 93 BPM | HEIGHT: 65 IN | DIASTOLIC BLOOD PRESSURE: 81 MMHG | OXYGEN SATURATION: 99 % | WEIGHT: 163 LBS | SYSTOLIC BLOOD PRESSURE: 144 MMHG | TEMPERATURE: 97 F | BODY MASS INDEX: 27.16 KG/M2 | RESPIRATION RATE: 18 BRPM

## 2022-01-20 DIAGNOSIS — K76.0 FATTY LIVER: ICD-10-CM

## 2022-01-20 PROCEDURE — 91200 LIVER ELASTOGRAPHY: CPT | Performed by: NURSE PRACTITIONER

## 2022-01-20 PROCEDURE — 99202 OFFICE O/P NEW SF 15 MIN: CPT | Performed by: NURSE PRACTITIONER

## 2022-01-20 NOTE — PROGRESS NOTES
[address] [facility], MD, [address], [place], [place]      Jonny Sevilla, RYLIE Ocampo, ACNP-BC     Andreea SCOTT Carlie, AGPCNP-BC   Shubham MahoneyDALY-SHAHRZAD    Susy Peña, [facility]       Sofiya Romain [facility] 136    at [address]    [address] [place] [address], [facility] [place]  22.    [phone]    FAX: [address] [place] [address] Avenue    [address], [address]    [phone]    FAX: [phone]     Patient Care Team:  Kandi Vanessa MD as PCP - General (Family Medicine)  Kandi Vanessa MD as PCP - [facility]  Kedar Montalvo MD (Gastroenterology)    Patient Active Problem List   Diagnosis Code    Acquired spondylolisthesis of cervical vertebra M43.12    Fatty liver K76.0     The clinicians listed above have asked me to see Loly Limon in consultation regarding fatty liver disease and to perform assessment of fibrosis by means of in-office FibroScan analysis. The patient is a 46 y.o.  female who was found to have liver disease. The patient has the following symptoms which are thought to be due to the liver disease: pain in the right side over the liver. .    The patient has not experienced the following symptoms: fatigue, pain in the right side over the liver, yellowing of the eyes or skin or swelling of the lower extremities. The patient completes all daily activities without any functional limitations. ASSESSMENT AND PLAN:  NAFL with no fibrosis. Reviewed fatty liver diet with patient and provided a handout. Losing weight will reverse the fatty liver. Assessment of fibrosis was made through performance of FibroScan in the office today. The results of the analysis were shared with the patient in the office at the time of the procedure. A copy of the report was provided to the patient and will be forwarded to the referring medical practice. All questions were answered. The patient expressed a clear understanding of the above. Allergies   Allergen Reactions    Erythromycin Other (comments)     \"stomach pain\" vomiting    Penicillins Rash and Swelling     Pt is ok with taking Cephalosporins     Current Outpatient Medications on File Prior to Visit   Medication Sig Dispense Refill    cyclobenzaprine (FLEXERIL) 10 mg tablet Take 1 Tab by mouth three (3) times daily. 60 Tab 1    simvastatin (ZOCOR) 40 mg tablet Take 40 mg by mouth nightly.  multivitamin (ONE A DAY) tablet Take 1 Tab by mouth daily.  acetaminophen (Tylenol Extra Strength) 500 mg tablet Take 1,000 mg by mouth as needed for Pain.  ondansetron (ZOFRAN ODT) 4 mg disintegrating tablet Take 1 Tab by mouth every eight (8) hours as needed for Nausea or Vomiting. (Patient not taking: Reported on 1/20/2022) 10 Tab 0    naloxone (NARCAN) 0.4 mg/mL injection 1 mL by IntraVENous route as needed for Other (Give if breaths per minute < 10, may repeat dose in 15 min and contact MD). (Patient not taking: Reported on 1/20/2022) 1 mL 0    cholecalciferol (Vitamin D3) (5000 Units/125 mcg) tab tablet Take 1 Tab by mouth daily. (Patient not taking: Reported on 1/20/2022) 120 Tab 1     No current facility-administered medications on file prior to visit. PHYSICAL EXAMINATION:  Visit Vitals  BP (!) 144/81   Pulse 93   Temp 97 °F (36.1 °C) (Temporal)   Resp 18   Ht 5' 5\" (1.651 m)   Wt 163 lb (73.9 kg)   SpO2 99%   BMI 27.12 kg/m²     General: No acute distress. Skin: No spider angiomata. No jaundice. No palmar erythema. Abdomen: Soft non-tender. Liver size normal to percussion/palpation. Spleen not palpable. No obvious ascites. Extremities: No edema. No muscle wasting. No gross arthritic changes. Neurologic: Alert and oriented. Cranial nerves grossly intact.  No asterixis. LABORATORY STUDIES:  Liver [place] of [address] Units 4/15/2021   WBC 3.6 - 11.0 K/uL 7.2   ANC 1.8 - 8.0 K/UL 3.8   HGB 11.5 - 16.0 g/dL 13.7    - 400 K/uL 231   INR 0.9 - 1.1   1.1   AST 15 - 37 U/L 24   ALT 12 - 78 U/L 37   Alk Phos 45 - 117 U/L 64   Bili, Total 0.2 - 1.0 MG/DL 0.5   Albumin 3.5 - 5.0 g/dL 4.5   BUN 6 - 20 MG/DL 12   Creat 0.55 - 1.02 MG/DL 0.51 (L)   Na 136 - 145 mmol/L 138   K 3.5 - 5.1 mmol/L 3.9   Cl 97 - 108 mmol/L 105   CO2 21 - 32 mmol/L 27   Glucose 65 - 100 mg/dL 83     SEROLOGIES:  Not available or performed. Testing will be performed as indicated. LIVER HISTOLOGY:  1/2022. [facility]. EkPa was 4.5. IQR/med 20%. . The results suggested a fibrosis level of F0. The CAP score suggests fatty liver. ENDOSCOPIC PROCEDURES:  Not available or performed    RADIOLOGY:  Not available or performed    OTHER TESTING:  Not available or performed    FOLLOW-UP:  All of the issues listed above in the assessment and plan were discussed with the patient. All questions were answered. The patient expressed a clear understanding of the above. The patient will follow-up with the referring clinician.     Surendra Warner, [facility]-BC  Liver [place] [facility] [address] [facility], [address] [place] [address]  22. [phone]

## 2022-01-20 NOTE — PROGRESS NOTES
Latricia Valenzuela is a 46 y.o. female  HIPAA verified by two patient identifiers. Health Maintenance Due   Topic    Hepatitis C Screening     COVID-19 Vaccine (1)    Lipid Screen     Colorectal Cancer Screening Combo     DTaP/Tdap/Td series (2 - Td or Tdap)    Shingrix Vaccine Age 50> (1 of 2)    Breast Cancer Screen Mammogram     Flu Vaccine (1)     Chief Complaint   Patient presents with    Follow-up     Fibroscan     Visit Vitals  BP (!) 144/81   Pulse 93   Temp 97 °F (36.1 °C) (Temporal)   Resp 18   Ht 5' 5\" (1.651 m)   Wt 163 lb (73.9 kg)   SpO2 99%   BMI 27.12 kg/m²       Pain Scale: 0 - No pain/10  Pain Location:   1. Have you been to the ER, urgent care clinic since your last visit? Hospitalized since your last visit? No    2. Have you seen or consulted any other health care providers outside of the [address] [place] [address] since your last visit? Include any pap smears or colon screening.  No

## 2022-01-20 NOTE — LETTER
1/20/2022    Patient: Loly Limon   YOB: 1970   Date of Visit: 1/20/2022     Roque Adame MD  [address]  Via Fax: [phone]    Dear Roque Adame MD,      I saw our mutual patient Ms. Loly Limon at the [address] [facility] for evaluation. My note for this consultation is attached. If you have questions, please do not hesitate to call me. I look forward to following your patient along with you. Sincerely,    Bayron Paige.  Eulalio Tellez NP

## 2022-02-09 ENCOUNTER — TRANSCRIBE ORDER (OUTPATIENT)
Dept: SCHEDULING | Age: 52
End: 2022-02-09

## 2022-02-09 DIAGNOSIS — M81.0 AGE-RELATED OSTEOPOROSIS WITHOUT CURRENT PATHOLOGICAL FRACTURE: Primary | ICD-10-CM

## 2022-02-18 ENCOUNTER — HOSPITAL ENCOUNTER (OUTPATIENT)
Dept: MAMMOGRAPHY | Age: 52
Discharge: HOME | End: 2022-02-18
Attending: PHYSICIAN ASSISTANT
Payer: COMMERCIAL

## 2022-02-18 DIAGNOSIS — M81.0 AGE-RELATED OSTEOPOROSIS WITHOUT CURRENT PATHOLOGICAL FRACTURE: ICD-10-CM

## 2022-02-18 PROCEDURE — 77080 DXA BONE DENSITY AXIAL: CPT

## 2022-03-18 PROBLEM — M43.12 ACQUIRED SPONDYLOLISTHESIS OF CERVICAL VERTEBRA: Status: ACTIVE | Noted: 2021-04-27

## 2022-03-18 PROBLEM — K76.0 FATTY LIVER: Status: ACTIVE | Noted: 2022-01-20

## 2022-07-19 ENCOUNTER — OFFICE VISIT (OUTPATIENT)
Dept: HEMATOLOGY | Age: 52
End: 2022-07-19
Payer: COMMERCIAL

## 2022-07-19 ENCOUNTER — TELEPHONE (OUTPATIENT)
Dept: HEMATOLOGY | Age: 52
End: 2022-07-19

## 2022-07-19 VITALS
WEIGHT: 134.8 LBS | SYSTOLIC BLOOD PRESSURE: 144 MMHG | HEIGHT: 63 IN | OXYGEN SATURATION: 100 % | TEMPERATURE: 97 F | BODY MASS INDEX: 23.88 KG/M2 | HEART RATE: 102 BPM | DIASTOLIC BLOOD PRESSURE: 97 MMHG

## 2022-07-19 DIAGNOSIS — K76.0 NAFL (NONALCOHOLIC FATTY LIVER): Primary | ICD-10-CM

## 2022-07-19 PROCEDURE — 91200 LIVER ELASTOGRAPHY: CPT | Performed by: INTERNAL MEDICINE

## 2022-07-19 PROCEDURE — 99214 OFFICE O/P EST MOD 30 MIN: CPT | Performed by: INTERNAL MEDICINE

## 2022-07-19 NOTE — Clinical Note
8/3/2022    Patient: Cheryl Blanco   YOB: 1970   Date of Visit: 7/19/2022     Keenan Villaseñor MD  [address]  Via Fax: [phone]    Dear Keenan Villaseñor MD,      Thank you for referring Ms. Cheryl Blanco to [address] for evaluation. My notes for this consultation are attached. If you have questions, please do not hesitate to call me. I look forward to following your patient along with you.       Sincerely,    Jen Castro MD

## 2022-07-19 NOTE — PROGRESS NOTES
[address] [facility], MD, [address], [place], [place]      RYLIE Galarza [facility]     Andreea SCOTT Carlie [facility]   ANDRE Rodríguez [facility]       [facility] 136    at [place] [address], [address] [place] [address]  22.    [phone]    FAX: [address] [place] [address] Avenue    [address], [address]    [phone]    FAX: [phone]       Patient Care Team:  Kimani Barrett MD as PCP - General (Family Medicine)  Kimani Barrett MD as PCP - [address] [place] [address] [facility] Provider  Gonsalo Gage MD (Gastroenterology)      Patient Active Problem List   Diagnosis Code    Acquired spondylolisthesis of cervical vertebra M43.12    Fatty liver K76.0       Emily Worley is being seen at [address] for management of suspected non-alcoholic fatty liver (NAFL). The active problem list, all pertinent past medical history, medications, radiologic findings and laboratory findings related to the liver disorder were reviewed and discussed with the patient. The patient is a 46 y.o.  female who was found to have liver disease. A Fibroscan in  1/2022 demonstrated a liver stiffness of 4.5 kPa with CAP score 267 suggesting fatty liver with no fibrosis. Since the last office appointment:  The patient has followed our advise and has restricted carbs kin her diet. She has lost 30 pounds and her BMI is now normal.    Assessment of liver fibrosis with Fibroscan was performed in the office again today. The result was 3.8 kPa with  indicating that fatty liver has resolved. There is no fibrosis. In the office today:  The patient does not have any symptoms which could be attributed to the liver disorder.   She says she feels better than she has in a decade. She does not miss carbs. The patient is not experiencing the following symptoms which are commonly seen in this liver disorder:   fatigue,   pain in the right side over the liver,     The patient completes all daily activities without any functional limitations. ASSESSMENT AND PLAN:  Benign steatosis/NAFL  The diagnosis is based upon imaging, Fiboscan CAP score,     Fibroscan in 1/2022 demonstrated  4.5 kPa and  suggesting fatty liver and no fibrosis  Fibroscan in 7/2022 demonstrated  3.8 kPa and  suggesting a normal liver    The patient has lost 30 pounds and all evidence of fatty liver has resolved. She no longer has any liver issue    She is to remain on a low carb diet to maintain her weight stable. Allergies   Allergen Reactions    Erythromycin Other (comments)     \"stomach pain\" vomiting    Penicillins Rash and Swelling     Pt is ok with taking Cephalosporins     Current Outpatient Medications on File Prior to Visit   Medication Sig Dispense Refill    cyclobenzaprine (FLEXERIL) 10 mg tablet Take 1 Tab by mouth three (3) times daily. 60 Tab 1    simvastatin (ZOCOR) 40 mg tablet Take 40 mg by mouth nightly. acetaminophen (Tylenol Extra Strength) 500 mg tablet Take 1,000 mg by mouth as needed for Pain. ondansetron (ZOFRAN ODT) 4 mg disintegrating tablet Take 1 Tab by mouth every eight (8) hours as needed for Nausea or Vomiting. (Patient not taking: Reported on 1/20/2022) 10 Tab 0    naloxone (NARCAN) 0.4 mg/mL injection 1 mL by IntraVENous route as needed for Other (Give if breaths per minute < 10, may repeat dose in 15 min and contact MD). (Patient not taking: Reported on 1/20/2022) 1 mL 0    cholecalciferol (Vitamin D3) (5000 Units/125 mcg) tab tablet Take 1 Tab by mouth daily. (Patient not taking: Reported on 1/20/2022) 120 Tab 1    multivitamin (ONE A DAY) tablet Take 1 Tab by mouth daily.        No current facility-administered medications on file prior to visit. PHYSICAL EXAMINATION:  Visit Vitals  BP (!) 144/97 (BP 1 Location: Left upper arm, BP Patient Position: Sitting, BP Cuff Size: Adult)   Pulse (!) 102   Temp 97 °F (36.1 °C) (Temporal)   Ht 5' 3\" (1.6 m)   Wt 134 lb 12.8 oz (61.1 kg)   SpO2 100%   BMI 23.88 kg/m²     General: No acute distress. Skin: No spider angiomata. No jaundice. No palmar erythema. Abdomen: Soft non-tender. Liver size normal to percussion/palpation. Spleen not palpable. No obvious ascites. Extremities: No edema. No muscle wasting. No gross arthritic changes. Neurologic: Alert and oriented. Cranial nerves grossly intact. No asterixis. LABORATORY STUDIES:  Liver [place] of [address] [place] [address] 4/15/2021   WBC 3.6 - 11.0 K/uL 7.2   ANC 1.8 - 8.0 K/UL 3.8   HGB 11.5 - 16.0 g/dL 13.7    - 400 K/uL 231   INR 0.9 - 1.1   1.1   AST 15 - 37 U/L 24   ALT 12 - 78 U/L 37   Alk Phos 45 - 117 U/L 64   Bili, Total 0.2 - 1.0 MG/DL 0.5   Albumin 3.5 - 5.0 g/dL 4.5   BUN 6 - 20 MG/DL 12   Creat 0.55 - 1.02 MG/DL 0.51 (L)   Na 136 - 145 mmol/L 138   K 3.5 - 5.1 mmol/L 3.9   Cl 97 - 108 mmol/L 105   CO2 21 - 32 mmol/L 27   Glucose 65 - 100 mg/dL 83     SEROLOGIES:  Not available or performed. Testing will be performed as indicated. LIVER HISTOLOGY:  1/2022. FibroScan performed at [address]. EkPa was 4.5. IQR/med 20%. . The results suggested a fibrosis level of F0. The CAP score suggests fatty liver.  7/2022. FibroScan performed at [address]. EkPa was 3.8. IQR/med 20%. . The results suggested a fibrosis level of F0. The CAP score suggests there is no fatty liver. ENDOSCOPIC PROCEDURES:  Not available or performed    RADIOLOGY:  Not available or performed    OTHER TESTING:  Not available or performed    FOLLOW-UP:  All of the issues listed above in the Assessment and Plan were discussed with the patient.   All questions were answered. The patient expressed a clear understanding of the above. No follow-up at [facility] is needed. I would be glad to see the patient back for follow-up at any time in the future if the clinical situation changes.       MD Bhupinder Herrera[address] [place] [address].  [phone]  [address] [place] [address]

## 2022-07-19 NOTE — PROGRESS NOTES
Identified pt with two pt identifiers(name and ). Reviewed record in preparation for visit and have obtained necessary documentation. Chief Complaint   Patient presents with    Fatty Liver     6month f/u with MLS      Vitals:    22 1416   BP: (!) 144/97   Pulse: (!) 102   Temp: 97 °F (36.1 °C)   TempSrc: Temporal   SpO2: 100%   Weight: 134 lb 12.8 oz (61.1 kg)   Height: 5' 3\" (1.6 m)   PainSc:   2       Health Maintenance Review: Patient reminded of \"due or due soon\" health maintenance. I have asked the patient to contact his/her primary care provider (PCP) for follow-up on his/her health maintenance. Coordination of Care Questionnaire:  :   1) Have you been to an emergency room, urgent care, or hospitalized since your last visit? If yes, where when, and reason for visit? no       2. Have seen or consulted any other health care provider since your last visit? If yes, where when, and reason for visit? NO      Patient is accompanied by self I have received verbal consent from Prachi Garcia to discuss any/all medical information while they are present in the room.

## 2022-08-03 PROBLEM — K76.0 NAFL (NONALCOHOLIC FATTY LIVER): Status: ACTIVE | Noted: 2022-01-20

## 2023-02-28 ENCOUNTER — TRANSCRIBE ORDER (OUTPATIENT)
Dept: SCHEDULING | Age: 53
End: 2023-02-28

## 2023-02-28 DIAGNOSIS — R13.10 DYSPHAGIA, UNSPECIFIED: Primary | ICD-10-CM

## (undated) DEVICE — INTENDED FOR TISSUE SEPARATION, AND OTHER PROCEDURES THAT REQUIRE A SHARP SURGICAL BLADE TO PUNCTURE OR CUT.: Brand: BARD-PARKER ® CARBON RIB-BACK BLADES

## (undated) DEVICE — PACKING 8004000 NEURAY 200PK 13X13MM: Brand: NEURAY ®

## (undated) DEVICE — COVER LT HNDL PLAS RIG 1 PER PK

## (undated) DEVICE — BONE MARROW KIT ASPIR 11 GA

## (undated) DEVICE — TOTAL TRAY, 16FR 10ML SIL FOLEY, URN: Brand: MEDLINE

## (undated) DEVICE — SOL IRR SOD CL 0.9% 1000ML BTL --

## (undated) DEVICE — NDL PRT INJ NSAF BLNT 18GX1.5 --

## (undated) DEVICE — DRAPE,REIN 53X77,STERILE: Brand: MEDLINE

## (undated) DEVICE — SURGICAL PROCEDURE PACK BASIN MAJ SET CUST NO CAUT

## (undated) DEVICE — STRAP,POSITIONING,KNEE/BODY,FOAM,4X60": Brand: MEDLINE

## (undated) DEVICE — C-ARMOR C-ARM EQUIPMENT COVERS CLEAR STERILE UNIVERSAL FIT 12 PER CASE: Brand: C-ARMOR

## (undated) DEVICE — CODMAN® SURGICAL PATTIES 3/4" X 3/4" (1.91CM X 1.91CM): Brand: CODMAN®

## (undated) DEVICE — C-ARM: Brand: UNBRANDED

## (undated) DEVICE — CANISTER, RIGID, 3000CC: Brand: MEDLINE INDUSTRIES, INC.

## (undated) DEVICE — STRIP,CLOSURE,WOUND,MEDI-STRIP,1/2X4: Brand: MEDLINE

## (undated) DEVICE — SUTURE STRATAFIX SPRL MCRYL + SZ 3-0 L24IN ABSRB UD PS-2 SXMP1B108

## (undated) DEVICE — CATHETER IV 14GA L1.25IN TEF STR HUB INTROCAN SFTY

## (undated) DEVICE — INSULATED BLADE ELECTRODE: Brand: EDGE

## (undated) DEVICE — TOOL DC SP12MH30 LGD 12CM PROX 3.0 MH: Brand: MIDAS REX CLEARVIEW™

## (undated) DEVICE — SPONGE LAP 18X18IN STRL -- 5/PK

## (undated) DEVICE — KIT EVACUATOR 7FR 400CC PVC RADIOPAQUE

## (undated) DEVICE — SYR 20ML LL STRL LF --

## (undated) DEVICE — SCREW EXT FIX L14MM FOR DISTRCTN

## (undated) DEVICE — NEEDLE HYPO 18GA L1.5IN PNK S STL HUB POLYPR SHLD REG BVL

## (undated) DEVICE — POWDER HEMOSTAT GEL 3.0GR -- SURGICEL

## (undated) DEVICE — PREP SKN CHLRAPRP APL 26ML STR --

## (undated) DEVICE — TOWEL SURG W17XL27IN STD BLU COT NONFENESTRATED PREWASHED

## (undated) DEVICE — SOL IRR STRL H2O 1000ML BTL --

## (undated) DEVICE — TOOL 14MH30 LEGEND 14CM 3MM: Brand: MIDAS REX ™

## (undated) DEVICE — APPLICATOR MEDICATED 10.5 CC SOLUTION HI LT ORNG CHLORAPREP

## (undated) DEVICE — NDL SPNE QNCKE 18GX3.5IN LF --

## (undated) DEVICE — TRAY PREP DRY W/ PREM GLV 2 APPL 6 SPNG 2 UNDPD 1 OVERWRAP

## (undated) DEVICE — DRAPE MICSCP W46XL120IN FOR ZEISS MD FEATURING CLEARLENS

## (undated) DEVICE — BLANKET WRM AD PREM MISTRAL-AIR

## (undated) DEVICE — SEALANT HEMOSTAT W/THROM 8ML -- SURGIFLO MATRIX

## (undated) DEVICE — TAPE,ELASTIC,FOAM,CURAD,3"X5.5YD,LF: Brand: CURAD

## (undated) DEVICE — DERMABOND SKIN ADH 0.7ML -- DERMABOND ADVANCED 12/BX

## (undated) DEVICE — STERILE POLYISOPRENE POWDER-FREE SURGICAL GLOVES WITH EMOLLIENT COATING: Brand: PROTEXIS

## (undated) DEVICE — TIP CLEANER: Brand: VALLEYLAB

## (undated) DEVICE — MAGNETIC INSTR DRAPE 20X16: Brand: MEDLINE INDUSTRIES, INC.

## (undated) DEVICE — GOWN,SIRUS,NONRNF,SETINSLV,2XL,18/CS: Brand: MEDLINE

## (undated) DEVICE — MAYFIELD® DISPOSABLE ADULT SKULL PIN (PLASTIC BASE): Brand: MAYFIELD®

## (undated) DEVICE — 5.0MM PRECISION ROUND

## (undated) DEVICE — STERILE POLYISOPRENE POWDER-FREE SURGICAL GLOVES: Brand: PROTEXIS

## (undated) DEVICE — Z DUPLICATE USE 2275497 DRSG POSTOP PRMSL AG 3.5X6IN

## (undated) DEVICE — BIPOLAR FORCEPS CORD: Brand: VALLEYLAB

## (undated) DEVICE — BIT DRL L12MM DIA2.3MM CERV STP W/ EPOXY RNG DISP PYRENEES

## (undated) DEVICE — LAMINECTOMY RICHMOND-LF: Brand: MEDLINE INDUSTRIES, INC.

## (undated) DEVICE — TAPE,CLOTH/SILK,CURAD,3"X10YD,LF,40/CS: Brand: CURAD

## (undated) DEVICE — SUTURE VCRL SZ 0 L36IN ABSRB UD L36MM CT-1 1/2 CIR J946H

## (undated) DEVICE — DRAPE MICSCP XLN W48XL118IN 2 BRDG STEREO OBS ZEISS

## (undated) DEVICE — ALCOHOL RUBBING ISO 16OZ 70%

## (undated) DEVICE — CONTAINER,SPECIMEN,3OZ,OR STRL: Brand: MEDLINE

## (undated) DEVICE — SPONGE GZ W4XL4IN COT 12 PLY TYP VII WVN C FLD DSGN

## (undated) DEVICE — STERILE-Z SURGICAL PATIENT COVERS CLEAR POLYETHYLENE STERILE UNIVERSAL FIT 10 PER CASE: Brand: STERILE-Z

## (undated) DEVICE — SYR 10ML LUER LOK 1/5ML GRAD --

## (undated) DEVICE — DRAPE,UTILITY,TAPE,15X26,STERILE: Brand: MEDLINE

## (undated) DEVICE — INFECTION CONTROL KIT SYS

## (undated) DEVICE — REM POLYHESIVE ADULT PATIENT RETURN ELECTRODE: Brand: VALLEYLAB

## (undated) DEVICE — ROCKER SWITCH PENCIL BLADE ELECTRODE, HOLSTER: Brand: EDGE

## (undated) DEVICE — TRAP MUCUS SPECIMEN 40ML -- MEDICHOICE

## (undated) DEVICE — 1010 S-DRAPE TOWEL DRAPE 10/BX: Brand: STERI-DRAPE™

## (undated) DEVICE — PACK,BASIC,SIRUS,V: Brand: MEDLINE

## (undated) DEVICE — SYRINGE IRRIG 60ML SFT PLIABLE BLB EZ TO GRP 1 HND USE W/

## (undated) DEVICE — COVER,TABLE,60X90,STERILE: Brand: MEDLINE

## (undated) DEVICE — SPONGE: SPECIALTY PEANUT XR 100/CS: Brand: MEDICAL ACTION INDUSTRIES

## (undated) DEVICE — SUTURE VCRL SZ 3-0 L27IN ABSRB UD L26MM SH 1/2 CIR J416H

## (undated) DEVICE — SUTURE VCRL SZ 1 L36IN ABSRB UD L36MM CT-1 1/2 CIR J947H

## (undated) DEVICE — PAD BD MATTRESS 73X32 IN STD CONVOLUTED FOAM LTX FREE

## (undated) DEVICE — SPHERE STEALTH 12PK/TY --

## (undated) DEVICE — COVER,MAYO STAND,STERILE: Brand: MEDLINE

## (undated) DEVICE — GLOVE SURG SZ 85 L12IN THK75MIL DK GRN LTX FREE

## (undated) DEVICE — DRAPE,LAP,CHOLE,W/TROUGHS,STERILE: Brand: MEDLINE

## (undated) DEVICE — KIT EVAC 0.13IN RECT TB DIA10FR 400CC PVC 3 SPR Y CONN DRN

## (undated) DEVICE — SYR LR LCK 1ML GRAD NSAF 30ML --

## (undated) DEVICE — 3M™ IOBAN™ 2 ANTIMICROBIAL INCISE DRAPE 6650EZ: Brand: IOBAN™ 2

## (undated) DEVICE — DRAPE SHT 3 QTR PROXIMA 53X77 --

## (undated) DEVICE — TELFA ADHESIVE ISLAND DRESSING: Brand: TELFA

## (undated) DEVICE — MASTISOL ADHESIVE LIQ 2/3ML

## (undated) DEVICE — TRAP,MUCUS SPECIMEN, 80CC: Brand: MEDLINE

## (undated) DEVICE — BONE WAX WHITE: Brand: BONE WAX WHITE